# Patient Record
Sex: FEMALE | Race: BLACK OR AFRICAN AMERICAN | NOT HISPANIC OR LATINO | URBAN - METROPOLITAN AREA
[De-identification: names, ages, dates, MRNs, and addresses within clinical notes are randomized per-mention and may not be internally consistent; named-entity substitution may affect disease eponyms.]

---

## 2019-01-10 ENCOUNTER — EMERGENCY (EMERGENCY)
Facility: HOSPITAL | Age: 24
LOS: 0 days | Discharge: HOME | End: 2019-01-10
Attending: EMERGENCY MEDICINE
Payer: COMMERCIAL

## 2019-01-10 VITALS
HEIGHT: 60 IN | HEART RATE: 114 BPM | TEMPERATURE: 98 F | WEIGHT: 147.05 LBS | OXYGEN SATURATION: 100 % | RESPIRATION RATE: 17 BRPM | DIASTOLIC BLOOD PRESSURE: 90 MMHG | SYSTOLIC BLOOD PRESSURE: 142 MMHG

## 2019-01-10 DIAGNOSIS — M25.461 EFFUSION, RIGHT KNEE: ICD-10-CM

## 2019-01-10 DIAGNOSIS — M25.561 PAIN IN RIGHT KNEE: ICD-10-CM

## 2019-01-10 PROCEDURE — 99283 EMERGENCY DEPT VISIT LOW MDM: CPT

## 2019-01-10 PROCEDURE — 73562 X-RAY EXAM OF KNEE 3: CPT | Mod: 26,RT
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
## 2019-01-10 NOTE — ED PROVIDER NOTE - OBJECTIVE STATEMENT
this is a 22 yo f c/o of right knee pain s/p bf falling on knee x 3 days. Denies nausea, vomiting, fever, erythema,

## 2019-01-10 NOTE — ED ADULT NURSE NOTE - NSIMPLEMENTINTERV_GEN_ALL_ED
Implemented All Universal Safety Interventions:  [place] to call system. Call bell, personal items and telephone within reach. Instruct patient to call for assistance. Room bathroom lighting operational. Non-slip footwear when patient is off stretcher. Physically safe environment: no spills, clutter or unnecessary equipment. Stretcher in lowest position, wheels locked, appropriate side rails in place.

## 2019-02-14 ENCOUNTER — RESULT REVIEW (OUTPATIENT)
Age: 24
End: 2019-02-14

## 2020-08-06 ENCOUNTER — RESULT REVIEW (OUTPATIENT)
Age: 25
End: 2020-08-06

## 2021-01-17 PROBLEM — Z00.00 ENCOUNTER FOR PREVENTIVE HEALTH EXAMINATION: Status: ACTIVE | Noted: 2021-01-17

## 2021-01-20 ENCOUNTER — APPOINTMENT (OUTPATIENT)
Dept: PLASTIC SURGERY | Facility: CLINIC | Age: 26
End: 2021-01-20

## 2021-03-22 ENCOUNTER — RESULT REVIEW (OUTPATIENT)
Age: 26
End: 2021-03-22

## 2021-03-31 ENCOUNTER — APPOINTMENT (OUTPATIENT)
Dept: PLASTIC SURGERY | Facility: CLINIC | Age: 26
End: 2021-03-31

## 2021-04-19 ENCOUNTER — EMERGENCY (EMERGENCY)
Facility: HOSPITAL | Age: 26
LOS: 1 days | Discharge: HOME | End: 2021-04-19
Attending: EMERGENCY MEDICINE | Admitting: EMERGENCY MEDICINE
Payer: COMMERCIAL

## 2021-04-19 VITALS
RESPIRATION RATE: 18 BRPM | TEMPERATURE: 98 F | DIASTOLIC BLOOD PRESSURE: 74 MMHG | OXYGEN SATURATION: 100 % | SYSTOLIC BLOOD PRESSURE: 119 MMHG | HEIGHT: 60 IN | HEART RATE: 78 BPM

## 2021-04-19 VITALS
OXYGEN SATURATION: 100 % | RESPIRATION RATE: 18 BRPM | TEMPERATURE: 99 F | SYSTOLIC BLOOD PRESSURE: 118 MMHG | DIASTOLIC BLOOD PRESSURE: 69 MMHG | HEART RATE: 85 BPM

## 2021-04-19 LAB
ALBUMIN SERPL ELPH-MCNC: 4.1 G/DL — SIGNIFICANT CHANGE UP (ref 3.3–5)
ALP SERPL-CCNC: 82 U/L — SIGNIFICANT CHANGE UP (ref 40–120)
ALT FLD-CCNC: 9 U/L — SIGNIFICANT CHANGE UP (ref 4–33)
ANION GAP SERPL CALC-SCNC: 14 MMOL/L — SIGNIFICANT CHANGE UP (ref 7–14)
APPEARANCE UR: ABNORMAL
APTT BLD: 26.5 SEC — LOW (ref 27–36.3)
AST SERPL-CCNC: 27 U/L — SIGNIFICANT CHANGE UP (ref 4–32)
BACTERIA # UR AUTO: ABNORMAL
BILIRUB SERPL-MCNC: 0.3 MG/DL — SIGNIFICANT CHANGE UP (ref 0.2–1.2)
BILIRUB UR-MCNC: NEGATIVE — SIGNIFICANT CHANGE UP
BLD GP AB SCN SERPL QL: NEGATIVE — SIGNIFICANT CHANGE UP
BUN SERPL-MCNC: 7 MG/DL — SIGNIFICANT CHANGE UP (ref 7–23)
CALCIUM SERPL-MCNC: 10.1 MG/DL — SIGNIFICANT CHANGE UP (ref 8.4–10.5)
CHLORIDE SERPL-SCNC: 104 MMOL/L — SIGNIFICANT CHANGE UP (ref 98–107)
CO2 SERPL-SCNC: 20 MMOL/L — LOW (ref 22–31)
COLOR SPEC: ABNORMAL
CREAT SERPL-MCNC: 0.59 MG/DL — SIGNIFICANT CHANGE UP (ref 0.5–1.3)
DIFF PNL FLD: ABNORMAL
EPI CELLS # UR: ABNORMAL
GLUCOSE SERPL-MCNC: 103 MG/DL — HIGH (ref 70–99)
GLUCOSE UR QL: NEGATIVE — SIGNIFICANT CHANGE UP
HCG SERPL-ACNC: 906.1 MIU/ML — SIGNIFICANT CHANGE UP
HCT VFR BLD CALC: 34.8 % — SIGNIFICANT CHANGE UP (ref 34.5–45)
HGB BLD-MCNC: 11.1 G/DL — LOW (ref 11.5–15.5)
INR BLD: 1.18 RATIO — HIGH (ref 0.88–1.16)
KETONES UR-MCNC: ABNORMAL
LEUKOCYTE ESTERASE UR-ACNC: ABNORMAL
MCHC RBC-ENTMCNC: 29.2 PG — SIGNIFICANT CHANGE UP (ref 27–34)
MCHC RBC-ENTMCNC: 31.9 GM/DL — LOW (ref 32–36)
MCV RBC AUTO: 91.6 FL — SIGNIFICANT CHANGE UP (ref 80–100)
NITRITE UR-MCNC: NEGATIVE — SIGNIFICANT CHANGE UP
NRBC # BLD: 0 /100 WBCS — SIGNIFICANT CHANGE UP
NRBC # FLD: 0 K/UL — SIGNIFICANT CHANGE UP
PH UR: 6 — SIGNIFICANT CHANGE UP (ref 5–8)
PLATELET # BLD AUTO: 349 K/UL — SIGNIFICANT CHANGE UP (ref 150–400)
POTASSIUM SERPL-MCNC: 4.4 MMOL/L — SIGNIFICANT CHANGE UP (ref 3.5–5.3)
POTASSIUM SERPL-SCNC: 4.4 MMOL/L — SIGNIFICANT CHANGE UP (ref 3.5–5.3)
PROT SERPL-MCNC: 8.6 G/DL — HIGH (ref 6–8.3)
PROT UR-MCNC: ABNORMAL
PROTHROM AB SERPL-ACNC: 13.4 SEC — SIGNIFICANT CHANGE UP (ref 10.6–13.6)
RBC # BLD: 3.8 M/UL — SIGNIFICANT CHANGE UP (ref 3.8–5.2)
RBC # FLD: 13 % — SIGNIFICANT CHANGE UP (ref 10.3–14.5)
RBC CASTS # UR COMP ASSIST: >50 /HPF — SIGNIFICANT CHANGE UP (ref 0–4)
RH IG SCN BLD-IMP: POSITIVE — SIGNIFICANT CHANGE UP
SARS-COV-2 RNA SPEC QL NAA+PROBE: SIGNIFICANT CHANGE UP
SODIUM SERPL-SCNC: 138 MMOL/L — SIGNIFICANT CHANGE UP (ref 135–145)
SP GR SPEC: 1.03 — HIGH (ref 1.01–1.02)
UROBILINOGEN FLD QL: SIGNIFICANT CHANGE UP
WBC # BLD: 9.13 K/UL — SIGNIFICANT CHANGE UP (ref 3.8–10.5)
WBC # FLD AUTO: 9.13 K/UL — SIGNIFICANT CHANGE UP (ref 3.8–10.5)
WBC UR QL: SIGNIFICANT CHANGE UP /HPF (ref 0–5)

## 2021-04-19 PROCEDURE — 76830 TRANSVAGINAL US NON-OB: CPT | Mod: 26

## 2021-04-19 PROCEDURE — 99285 EMERGENCY DEPT VISIT HI MDM: CPT

## 2021-04-19 RX ORDER — ONDANSETRON 8 MG/1
4 TABLET, FILM COATED ORAL ONCE
Refills: 0 | Status: COMPLETED | OUTPATIENT
Start: 2021-04-19 | End: 2021-04-19

## 2021-04-19 RX ORDER — SODIUM CHLORIDE 9 MG/ML
1000 INJECTION INTRAMUSCULAR; INTRAVENOUS; SUBCUTANEOUS ONCE
Refills: 0 | Status: COMPLETED | OUTPATIENT
Start: 2021-04-19 | End: 2021-04-19

## 2021-04-19 RX ORDER — KETOROLAC TROMETHAMINE 30 MG/ML
15 SYRINGE (ML) INJECTION ONCE
Refills: 0 | Status: DISCONTINUED | OUTPATIENT
Start: 2021-04-19 | End: 2021-04-19

## 2021-04-19 RX ADMIN — ONDANSETRON 4 MILLIGRAM(S): 8 TABLET, FILM COATED ORAL at 10:16

## 2021-04-19 RX ADMIN — SODIUM CHLORIDE 1000 MILLILITER(S): 9 INJECTION INTRAMUSCULAR; INTRAVENOUS; SUBCUTANEOUS at 11:32

## 2021-04-19 RX ADMIN — Medication 15 MILLIGRAM(S): at 10:15

## 2021-04-19 NOTE — CONSULT NOTE ADULT - ASSESSMENT
24 yo  dating 9.0 wks gestational age by LMP 2/15 presenting w/ abdominal cramping, nausea, and vaginal bleeding s/p med ab . TVUS w/ thickened endometrium to 14mm w/ internal vascularity c/w retained POC.  Patient hemodynamically and vitally stable.  Cramping and nausea resolved in ED w/ use of toradol and zofran.  PAtient counseled on options of surgical vs. medical management for retained POC, patient electing for medical management.    -Rx for misoprostol 800 mcg buccally given- patient informed to allow medication to dissolve x30 min then swallow  -Rx for ibuprofen and oxycodone for pain control sent, zofran for nausea  -emergency precautions given: return if bleeding more than 2 pads an hours for 2 hours, intractable nausea/vomiting, foul smelling vaginal discharge, fever, or feeling may pass out  -patient to follow up in office later this week    d/w Dr. Onel Ozuna PGY2

## 2021-04-19 NOTE — CONSULT NOTE ADULT - SUBJECTIVE AND OBJECTIVE BOX
ANÍBAL LAZO  25y  Female [id number]    HPI:  24 yo  dating 9.0 wks gestational age by LMP 2/15 presenting w/ abdominal cramping, nausea, and vaginal bleeding s/p med ab .  Patient was followed in the office for a non-viable pregnancy.  Patient initially had heavy bleeding and cramping following administration of vaginal cytotec, she continued to have pain w/ light bleeding which is why she presented today.  Pain associated w/ nausea and emesis, last this am.  NPO since last night.  Denies fever, chills, SOB, chest pain.    [facility]    POB:  eTOP 4 years ago, medical management    Pgyn: Denies fibroids, cysts, endometriosis, STI's.  Patient reports recent abnormal pap smear to be followed up    Home meds: none    Allergies    No Known Allergies    Intolerances    PAST MEDICAL & SURGICAL HISTORY:  No pertinent past medical history    No significant past surgical history    FAMILY HISTORY:  No pertinent family history in first degree relatives    Vital Signs Last 24 Hrs  T(C): 36.7 (2021 09:27), Max: 36.7 (2021 09:27)  T(F): 98 (2021 09:27), Max: 98 (2021 09:27)  HR: 78 (2021 09:27) (78 - 78)  BP: 119/74 (2021 09:27) (119/74 - 119/74)  BP(mean): --  RR: 18 (2021 09:27) (18 - 18)  SpO2: 100% (2021 09:27) (100% - 100%)    Physical Exam:   General: sitting comfortably in bed, NAD   HEENT: neck supple, full ROM  CV: RRR  Lungs: nonlabored breathing on room air   Abd: Soft, non-tender, non-distended.  Bowel sounds present.    :   spotting on pad.  External labia wnl.  Bimanual exam with no cervical motion tenderness, uterus wnl, adnexa non palpable b/l.  Cervix dilated 0.5 cm  Speculum Exam: No active bleeding from os.  Scant old blood in vault.  Small amount of tissue present at the os that was extracted w/ ring forceps, attempts to remove additional tissue unsuccessful  Ext: non-tender b/l, no edema     LABS:                      11.1   9.13  )-----------( 349      ( 2021 10:45 )             34.8         138  |  104  |  7   ----------------------------<  103<H>  4.4   |  20<L>  |  0.59    Ca    10.1      2021 10:45    TPro  8.6<H>  /  Alb  4.1  /  TBili  0.3  /  DBili  x   /  AST  27  /  ALT  9   /  AlkPhos  82      I&O's Detail      Urinalysis Basic - ( 2021 10:45 )    Color: Brown / Appearance: Turbid / S.028 / pH: x  Gluc: x / Ketone: Trace  / Bili: Negative / Urobili: <2 mg/dL   Blood: x / Protein: 100 mg/dL / Nitrite: Negative   Leuk Esterase: Small / RBC: >50 /HPF / WBC 5-10 /HPF   Sq Epi: x / Non Sq Epi: Moderate / Bacteria: Few    RADIOLOGY & ADDITIONAL STUDIES:  < from: US Transvaginal (21 @ 10:43) >  EXAM:  US TRANSVAGINAL      PROCEDURE DATE:  2021     INTERPRETATION:  CLINICAL INFORMATION: Abdominal pain after  6 days ago, told no heartbeat, patient was 6 weeks pregnant, got intravaginal meds for     LMP: N/A    COMPARISON: None available.    TECHNIQUE:  Endovaginal pelvic sonogram only.    FINDINGS:    Uterus/Endometrium: 9.0 cm x 6.2 cm x 6.0 cm.    Thickened heterogeneous endometrial complex with increased vascularity measuring 14 mm, consistent with presence of hemorrhagic products and retained products of conception.    Right ovary: 3.3 cm x 1.9 cm x 2.5 cm. Within normal limits. Right corpus luteum measuring 1.9 cm.  Left ovary: 2.6 cm x 1.4 cm x 0.9 cm. Within normal limits.    Fluid: None.    IMPRESSION:  Thickened heterogeneous endometrial complex with increased vascularity consistent with the presence of hemorrhagic products and retained products of conception.    OSEI EMERY MD, Resident Radiology  This document has been electronically signed.  ROSANNA MALDONADO MD; Attending Radiologist  This document has been electronically signed. 2021 11:02AM

## 2021-04-19 NOTE — ED PROVIDER NOTE - PROGRESS NOTE DETAILS
+ retained product on US. OB-GYN consulted and will evaluate patient. Type and screen ordered. Patient updated and is non-toxic at this time.

## 2021-04-19 NOTE — ED PROVIDER NOTE - CONSTITUTIONAL, MLM
Non-toxic appearing, alert, awake, and speaking full sentences. Patient mildly uncomfortable appearing. normal...

## 2021-04-19 NOTE — ED PROVIDER NOTE - ATTENDING CONTRIBUTION TO CARE
Gong: I have seen and examined the patient face to face, have reviewed and addended the HPI, PE and a/p as necessary.     26 yo  (recently 6 weeks pregnant) with 6 days of lower abdominal pain after failed pregnancy.  Pt reports taking intravaginal medication to induce  and has been having worsening daily lower abdominal cramping radiating to lower back.  Reports passing clots.  Noted to have pain rated 9/10 in nature.  Noted to have no fever, no chills, no headache, vision changes, LE pain or swelling.      GEN - NAD; A+O x3; uncomfortable appearing; CARD -s1s2, RRR, no M,G,R; PULM - CTA b/l, symmetric breath sounds; ABD -  +BS, TTP in suprapubic region, soft, no guarding, no rebound, no masses; BACK - no CVA tenderness, Normal  spine; EXT - symmetric pulses, 2+ dp, capillary refill < 2 seconds, no cyanosis, no edema; NEURO - no focal neuro deficits, no slurred speech     Vaginal exam as per Fellow Shelia.    26 yo  (recently 6 weeks pregnant) with 6 days of lower abdominal pain after failed pregnancy. Concern for possible ectopic, vs retained products of conceptions vs possible inevitable .  - follow up tvus, labs, including hcg.  Reassess.  Pain control

## 2021-04-19 NOTE — ED ADULT NURSE NOTE - OBJECTIVE STATEMENT
Pt presenting to intake AxOx4 ambulatory at baseline with c.o pelvic cramping, N/V. Pt s/p miscarriage over the weekend- states she was 6 weeks pregnant. On arrival to ED pt's breathing is even and unlabored. Palor/diaphoresis not noted. pt denies CP, SOB, fever. IV established with 20g in LAC. Labs drawn and sent. MD at bedside, will continue to monitor.

## 2021-04-19 NOTE — ED PROVIDER NOTE - GENITOURINARY VAGINAL
Significant blood clots in the vaginal vault without significant active bleeding./BLOOD IN VAGINAL VAULT

## 2021-04-19 NOTE — ED ADULT NURSE REASSESSMENT NOTE - NS ED NURSE REASSESS COMMENT FT1
pt resting in results waiting. pt alert and oriented x4. Denies pain or discomforts at this time. Appears comfortable. Pt waiting for GYN

## 2021-04-19 NOTE — ED PROVIDER NOTE - PATIENT PORTAL LINK FT
You can access the FollowMyHealth Patient Portal offered by [facility] by registering at the following website: [email or web address]. By joining [employer]’s FollowMyHealth portal, you will also be able to view your health information using other applications (apps) compatible with our system.

## 2021-04-19 NOTE — ED PROVIDER NOTE - OBJECTIVE STATEMENT
26 y/o F  presents for 6 days of lower abdominal pain. Reports she was 6 weeks pregnant and went to a [facility] in [place]. Patient was told at the clinic there was no fetal heartbeat and was provided intravaginal medication to induce an . Since then, she has had daily lower cramping abdominal pain that occasionally radiates to lower back. Pain slightly better with Ibuprofen and nothing makes the pain worse. Associated with blood with urination, small blood clots from vagina, and daily NBNB vomiting. She is unable to quantify how many times per day she is vomiting because it is very frequent. Denies abdominal pain outside of her lower abdominal region, chest pain, leg swelling/redness, headache, vision changes, and history of HTN. 26 y/o F  presents for 6 days of lower abdominal pain. Reports she was 6 weeks pregnant and went to a [facility] Clinic in [place]. Patient was told at the clinic there was no fetal heartbeat and was provided intravaginal medication to induce an . Since then, she has had daily lower cramping abdominal pain that occasionally radiates to lower back. Pain slightly better with Ibuprofen and nothing makes the pain worse. Associated with blood with urination, small blood clots from vagina, and daily NBNB vomiting. She is unable to quantify how many times per day she is vomiting because it is very frequent. Denies abdominal pain outside of her lower abdominal region, chest pain, leg swelling/redness, headache, vision changes, or history of HTN.

## 2021-04-19 NOTE — ED PROVIDER NOTE - CLINICAL SUMMARY MEDICAL DECISION MAKING FREE TEXT BOX
24 y/o F with recent  seen for persistent pain, vomiting, and slight vaginal bleeding. Uncomfortable, but non-toxic appearing. Hemodynamically stable. Abdomen is soft with suprapubic tenderness. Will obtain labs, US, and treat pain/nausea. US to look for retained product.

## 2021-04-19 NOTE — ED PROVIDER NOTE - NSFOLLOWUPINSTRUCTIONS_ED_ALL_ED_FT
You were seen for abdominal pain. Your ultrasound shows that there are still parts of the fetus inside the uterus. You were seen by the OB Gyn doctor and prescribed medications to be taken as directed by the OB Gyn doctor.     - Misoprostol 800 mcg buccally (inside the cheek)-  allow medication to dissolve for 30 min then swallow  - Prescription for ibuprofen and oxycodone for pain, Zofran for nausea  -  Return if bleeding more than 2 pads an hours for 2 hours, continuous nausea/vomiting, foul smelling vaginal discharge, fever, or feeling you may pass out, or any other concern for a medical emergency.  Follow up in office later this week as discussed with OB Gyn      Miscarriage    WHAT YOU NEED TO KNOW:    A miscarriage is the loss of a fetus within the first 20 weeks of pregnancy. A miscarriage may also be called a spontaneous  or an early pregnancy loss.    DISCHARGE INSTRUCTIONS:    Return to the emergency department if:   •You have foul-smelling drainage or pus coming from your vagina.    •You have heavy vaginal bleeding and soak 1 pad or more in an hour.    •You have severe abdominal pain.    •You feel like your heart is beating faster than normal.    •You feel extremely weak or dizzy.    Contact your healthcare provider if:   •You have a fever greater than 100.4°F or chills.    •You have extreme sadness, grief, or feel unable to cope with what has happened.    •You have questions or concerns about your condition or care.    Self-care:   •Do not put anything in your vagina for 2 weeks or as directed. Do not use tampons, douche, or have sex. These actions can cause infection and pain.    •Use sanitary pads as needed. You may have light bleeding or spotting for 2 weeks.    •Do not take a bath or go swimming for 2 weeks or as directed. These actions may increase your risk for an infection. Take showers only.    •Rest as needed. Slowly start to do more each day. Return to your daily activities as directed.    •Talk to your healthcare provider about birth control. If you would like to prevent another pregnancy, ask your healthcare provider which type of birth control is best for you.    •Join a support group or therapy to help you cope. A miscarriage may be very difficult for you, your partner, and other members of your family. There is no right way to feel after a miscarriage. You may feel overwhelming grief or other emotions. It may be helpful to talk to a friend, family member, or counselor about your feelings. You may worry that you could have another miscarriage. Talk to your healthcare provider about your concerns. He or she may be able to help you reduce the risk for another miscarriage. He or she may also help you find ways to cope with grief.    For more information:   •The [employer]  [address] [address]  [place] [address]  Phone: [phone]  Phone: 1-392.615.8277  Web Address: http://www.acog.org    •[facility] [facility]  [address] [place] [address]  Web Address: [email or web address]    Follow up with your healthcare provider as directed: You may need to see your healthcare provider for blood tests or an ultrasound. Write down your questions so you remember to ask them during your visits.

## 2021-04-21 ENCOUNTER — OUTPATIENT (OUTPATIENT)
Dept: OUTPATIENT SERVICES | Facility: HOSPITAL | Age: 26
LOS: 1 days | End: 2021-04-21
Payer: COMMERCIAL

## 2021-04-21 VITALS
SYSTOLIC BLOOD PRESSURE: 132 MMHG | TEMPERATURE: 98 F | HEART RATE: 78 BPM | WEIGHT: 169.09 LBS | RESPIRATION RATE: 16 BRPM | HEIGHT: 60 IN | DIASTOLIC BLOOD PRESSURE: 84 MMHG | OXYGEN SATURATION: 98 %

## 2021-04-21 DIAGNOSIS — Z01.818 ENCOUNTER FOR OTHER PREPROCEDURAL EXAMINATION: ICD-10-CM

## 2021-04-21 DIAGNOSIS — O02.1 MISSED ABORTION: ICD-10-CM

## 2021-04-21 LAB
BLD GP AB SCN SERPL QL: SIGNIFICANT CHANGE UP
SARS-COV-2 RNA SPEC QL NAA+PROBE: SIGNIFICANT CHANGE UP

## 2021-04-21 PROCEDURE — G0463: CPT

## 2021-04-21 PROCEDURE — 86901 BLOOD TYPING SEROLOGIC RH(D): CPT

## 2021-04-21 PROCEDURE — 86850 RBC ANTIBODY SCREEN: CPT

## 2021-04-21 PROCEDURE — 36415 COLL VENOUS BLD VENIPUNCTURE: CPT

## 2021-04-21 PROCEDURE — U0005: CPT

## 2021-04-21 PROCEDURE — U0003: CPT

## 2021-04-21 PROCEDURE — 86900 BLOOD TYPING SEROLOGIC ABO: CPT

## 2021-04-21 NOTE — H&P PST ADULT - NSANTHOSAYNRD_GEN_A_CORE
No. MARGRET screening performed.  STOP BANG Legend: 0-2 = LOW Risk; 3-4 = INTERMEDIATE Risk; 5-8 = HIGH Risk

## 2021-04-21 NOTE — H&P PST ADULT - HISTORY OF PRESENT ILLNESS
24 y/o female, LMP 2/15/21, first pregnancy, was seen in the office a week ago, , was told there is no heart beat, was given a pill for ,  started bleeding, however has retained products of conception. Was seen in [facility] ER on 21. Had blood work and US, confirmed the products of conception is retained. Scheduled for suction D&C, Miss: on 21. Pre op testing today.

## 2021-04-21 NOTE — H&P PST ADULT - NSICDXPROBLEM_GEN_ALL_CORE_FT
PROBLEM DIAGNOSES  Problem: Missed   Assessment and Plan: suction D&C  Pre op instructions:   Medical eval not needed.  Hold OTC supplements. Medications reviewed for the week and morning of surgery.  NPO after 11pm to the morning of surgery.  Covid testing done today  Patient verbalized understanding.

## 2021-04-22 ENCOUNTER — TRANSCRIPTION ENCOUNTER (OUTPATIENT)
Age: 26
End: 2021-04-22

## 2021-04-22 RX ORDER — SODIUM CHLORIDE 9 MG/ML
1000 INJECTION, SOLUTION INTRAVENOUS
Refills: 0 | Status: DISCONTINUED | OUTPATIENT
Start: 2021-04-23 | End: 2021-05-08

## 2021-04-22 RX ORDER — OXYCODONE HYDROCHLORIDE 5 MG/1
5 TABLET ORAL ONCE
Refills: 0 | Status: DISCONTINUED | OUTPATIENT
Start: 2021-04-23 | End: 2021-04-27

## 2021-04-22 RX ORDER — HYDROMORPHONE HYDROCHLORIDE 2 MG/ML
0.5 INJECTION INTRAMUSCULAR; INTRAVENOUS; SUBCUTANEOUS
Refills: 0 | Status: DISCONTINUED | OUTPATIENT
Start: 2021-04-23 | End: 2021-04-27

## 2021-04-22 RX ORDER — SODIUM CHLORIDE 9 MG/ML
1000 INJECTION, SOLUTION INTRAVENOUS
Refills: 0 | Status: DISCONTINUED | OUTPATIENT
Start: 2021-04-23 | End: 2021-04-23

## 2021-04-23 ENCOUNTER — RESULT REVIEW (OUTPATIENT)
Age: 26
End: 2021-04-23

## 2021-04-23 ENCOUNTER — OUTPATIENT (OUTPATIENT)
Dept: OUTPATIENT SERVICES | Facility: HOSPITAL | Age: 26
LOS: 1 days | Discharge: HOME | End: 2021-04-23
Payer: COMMERCIAL

## 2021-04-23 VITALS
DIASTOLIC BLOOD PRESSURE: 69 MMHG | TEMPERATURE: 99 F | RESPIRATION RATE: 15 BRPM | SYSTOLIC BLOOD PRESSURE: 119 MMHG | WEIGHT: 169.09 LBS | HEIGHT: 60 IN | OXYGEN SATURATION: 100 % | HEART RATE: 73 BPM

## 2021-04-23 VITALS
HEART RATE: 67 BPM | RESPIRATION RATE: 16 BRPM | SYSTOLIC BLOOD PRESSURE: 106 MMHG | DIASTOLIC BLOOD PRESSURE: 62 MMHG | OXYGEN SATURATION: 97 %

## 2021-04-23 PROCEDURE — 59820 CARE OF MISCARRIAGE: CPT

## 2021-04-23 PROCEDURE — 88305 TISSUE EXAM BY PATHOLOGIST: CPT

## 2021-04-23 PROCEDURE — 88305 TISSUE EXAM BY PATHOLOGIST: CPT | Mod: 26

## 2021-04-23 RX ORDER — ONDANSETRON 8 MG/1
4 TABLET, FILM COATED ORAL ONCE
Refills: 0 | Status: COMPLETED | OUTPATIENT
Start: 2021-04-23 | End: 2021-04-23

## 2021-04-23 RX ORDER — ACETAMINOPHEN 500 MG
975 TABLET ORAL ONCE
Refills: 0 | Status: COMPLETED | OUTPATIENT
Start: 2021-04-23 | End: 2021-04-23

## 2021-04-23 RX ORDER — ACETAMINOPHEN 500 MG
2 TABLET ORAL
Qty: 56 | Refills: 0
Start: 2021-04-23 | End: 2021-04-29

## 2021-04-23 RX ORDER — IBUPROFEN 200 MG
0 TABLET ORAL
Qty: 0 | Refills: 0 | DISCHARGE

## 2021-04-23 RX ORDER — IBUPROFEN 200 MG
1 TABLET ORAL
Qty: 28 | Refills: 0
Start: 2021-04-23 | End: 2021-04-29

## 2021-04-23 RX ADMIN — HYDROMORPHONE HYDROCHLORIDE 0.5 MILLIGRAM(S): 2 INJECTION INTRAMUSCULAR; INTRAVENOUS; SUBCUTANEOUS at 16:45

## 2021-04-23 RX ADMIN — SODIUM CHLORIDE 60 MILLILITER(S): 9 INJECTION, SOLUTION INTRAVENOUS at 13:03

## 2021-04-23 RX ADMIN — HYDROMORPHONE HYDROCHLORIDE 0.5 MILLIGRAM(S): 2 INJECTION INTRAMUSCULAR; INTRAVENOUS; SUBCUTANEOUS at 15:57

## 2021-04-23 RX ADMIN — ONDANSETRON 4 MILLIGRAM(S): 8 TABLET, FILM COATED ORAL at 15:54

## 2021-04-23 RX ADMIN — Medication 975 MILLIGRAM(S): at 13:01

## 2021-04-23 NOTE — BRIEF OPERATIVE NOTE - NSICDXBRIEFPREOP_GEN_ALL_CORE_FT
PRE-OP DIAGNOSIS:  Missed  2021 14:46:07  Maria C Sykes  Retained products of conception, early pregnancy 2021 14:46:30  Maria C Sykes

## 2021-04-23 NOTE — BRIEF OPERATIVE NOTE - NSICDXBRIEFPROCEDURE_GEN_ALL_CORE_FT
PROCEDURES:  Dilation and curettage, uterus, using suction, for missed first trimester  2021 14:45:58  Maria C Sykes

## 2021-04-23 NOTE — BRIEF OPERATIVE NOTE - OPERATION/FINDINGS
8 week size anteverted uterus 8 week size anteverted uterus  suction curretage performed   uterine atony noted, hembate and methergine IM given by anethesia  after suction, cervical lac at 3 o clock noted, cervical laceration repaired with 0 vicryl suture

## 2021-04-23 NOTE — ASU DISCHARGE PLAN (ADULT/PEDIATRIC) - CARE PROVIDER_API CALL
Maria C Sykes (DO)  Obstetrics and Gynecology Obstetrics and Gynocology  [address] [place] [address]  Phone: [phone]  Fax: [phone]  Established Patient  Follow Up Time: 2 weeks

## 2021-04-23 NOTE — ASU DISCHARGE PLAN (ADULT/PEDIATRIC) - CALL YOUR DOCTOR IF YOU HAVE ANY OF THE FOLLOWING:
Bleeding that does not stop/Pain not relieved by Medications/Fever greater than (need to indicate Fahrenheit or Celsius) Bleeding that does not stop/Pain not relieved by Medications/Fever greater than (need to indicate Fahrenheit or Celsius)/Nausea and vomiting that does not stop/Inability to tolerate liquids or foods

## 2021-04-23 NOTE — BRIEF OPERATIVE NOTE - NSICDXBRIEFPOSTOP_GEN_ALL_CORE_FT
POST-OP DIAGNOSIS:  Missed  2021 14:46:39  Maria C Sykes  Retained products of conception, early pregnancy 2021 14:47:07  Maria C Sykes

## 2021-09-01 ENCOUNTER — APPOINTMENT (OUTPATIENT)
Dept: PLASTIC SURGERY | Facility: CLINIC | Age: 26
End: 2021-09-01
Payer: COMMERCIAL

## 2021-09-01 VITALS
SYSTOLIC BLOOD PRESSURE: 120 MMHG | HEIGHT: 60 IN | DIASTOLIC BLOOD PRESSURE: 76 MMHG | WEIGHT: 175 LBS | BODY MASS INDEX: 34.36 KG/M2 | TEMPERATURE: 97.8 F | HEART RATE: 93 BPM

## 2021-09-01 DIAGNOSIS — N63.0 UNSPECIFIED LUMP IN UNSPECIFIED BREAST: ICD-10-CM

## 2021-09-01 DIAGNOSIS — N62 HYPERTROPHY OF BREAST: ICD-10-CM

## 2021-09-01 PROCEDURE — 99204 OFFICE O/P NEW MOD 45 MIN: CPT

## 2021-09-06 PROBLEM — N62 MACROMASTIA: Status: ACTIVE | Noted: 2021-09-06

## 2021-09-06 PROBLEM — N63.0 MASS OF NIPPLE: Status: ACTIVE | Noted: 2021-09-06

## 2021-09-06 NOTE — ASSESSMENT
[FreeTextEntry1] : I estimate approximately 600 g would be removed from each side.  I recommend excisional biopsy of the right areola lesion prior to considering breast reduction.  This can be performed in the office.  We will also need her prior right breast ultrasound.  I expressed concern with this right areola lesion could represent a keloid scar and may recur.  Additionally, anywhere she has covers may turn to keloid scars.  She expresses understanding.  I also explained that the significant infection may make it difficult for her to breast-feed, and, if she becomes pregnant, her breasts are likely to stretch out and become ptotic again.  I also reviewed the significant risk of loss of nipple sensation with this procedure.\par \par She agrees to bring the right breast ultrasound report, and we will plan for excisional biopsy of the right areolar lesion in the office.

## 2021-09-06 NOTE — HISTORY OF PRESENT ILLNESS
[FreeTextEntry1] : 18-year-old woman presents with upper back pain for over a year which she feels is secondary to her large breasts.  She currently wears a quadruple D brassiere and wishes to be a C cup if possible.  She has limited relief of her pain with over-the-counter pain medications, including nonsteroidal anti-inflammatory drugs.  She denies any lumps, bumps, or other recent changes in the breast.  She has a history of a right breast ultrasound for a right areolar lesion.  She states the lesion was not biopsied.  She denies any prior wound in that area.  She denies any family history of breast cancer.  She states that nipple sensation is very important to her (5 out of 5 importance).  And she plans to breast-feed in the future.  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  She lives with her father, aramis, whom she states would be able to assist her after surgery.  She denies any nicotine use or recreational drug use; she sometimes drinks alcohol.

## 2021-09-06 NOTE — PHYSICAL EXAM
[de-identified] : NAD.  BMI 34.2. [de-identified] : Normal respiratory effort. [de-identified] : Right areolar skin lesion 1x2 cm, feels confined to dermis. No dominant masses, nipple retraction, or palpable axillary lymphadenopathy. SN->N distance is 33 cm on the right and 32.5 cm on the left; width is 20.5 cm on the right and 20.5 cm on the left; N->IMF is 16 cm on the right and 15 cm on the left. There is grade 3 ptosis.

## 2021-10-22 ENCOUNTER — APPOINTMENT (OUTPATIENT)
Dept: PLASTIC SURGERY | Facility: CLINIC | Age: 26
End: 2021-10-22

## 2021-11-19 ENCOUNTER — ASOB RESULT (OUTPATIENT)
Age: 26
End: 2021-11-19

## 2021-11-19 ENCOUNTER — APPOINTMENT (OUTPATIENT)
Dept: ANTEPARTUM | Facility: CLINIC | Age: 26
End: 2021-11-19
Payer: MEDICAID

## 2021-11-19 PROCEDURE — 36416 COLLJ CAPILLARY BLOOD SPEC: CPT

## 2021-11-19 PROCEDURE — 76801 OB US < 14 WKS SINGLE FETUS: CPT

## 2021-11-19 PROCEDURE — 76813 OB US NUCHAL MEAS 1 GEST: CPT

## 2022-01-18 ENCOUNTER — APPOINTMENT (OUTPATIENT)
Dept: ANTEPARTUM | Facility: CLINIC | Age: 27
End: 2022-01-18
Payer: COMMERCIAL

## 2022-01-18 ENCOUNTER — ASOB RESULT (OUTPATIENT)
Age: 27
End: 2022-01-18

## 2022-01-18 PROCEDURE — 76811 OB US DETAILED SNGL FETUS: CPT

## 2022-01-28 ENCOUNTER — APPOINTMENT (OUTPATIENT)
Dept: ANTEPARTUM | Facility: CLINIC | Age: 27
End: 2022-01-28
Payer: COMMERCIAL

## 2022-01-28 ENCOUNTER — ASOB RESULT (OUTPATIENT)
Age: 27
End: 2022-01-28

## 2022-01-28 PROCEDURE — 76816 OB US FOLLOW-UP PER FETUS: CPT

## 2022-04-06 ENCOUNTER — APPOINTMENT (OUTPATIENT)
Dept: ANTEPARTUM | Facility: CLINIC | Age: 27
End: 2022-04-06

## 2022-04-07 ENCOUNTER — ASOB RESULT (OUTPATIENT)
Age: 27
End: 2022-04-07

## 2022-04-07 ENCOUNTER — APPOINTMENT (OUTPATIENT)
Dept: ANTEPARTUM | Facility: CLINIC | Age: 27
End: 2022-04-07
Payer: COMMERCIAL

## 2022-04-07 PROCEDURE — 76816 OB US FOLLOW-UP PER FETUS: CPT

## 2022-04-07 PROCEDURE — 76820 UMBILICAL ARTERY ECHO: CPT

## 2022-04-07 PROCEDURE — 76818 FETAL BIOPHYS PROFILE W/NST: CPT

## 2022-04-07 PROCEDURE — 99212 OFFICE O/P EST SF 10 MIN: CPT | Mod: 25

## 2022-04-11 ENCOUNTER — ASOB RESULT (OUTPATIENT)
Age: 27
End: 2022-04-11

## 2022-04-11 ENCOUNTER — APPOINTMENT (OUTPATIENT)
Dept: ANTEPARTUM | Facility: CLINIC | Age: 27
End: 2022-04-11
Payer: COMMERCIAL

## 2022-04-11 PROCEDURE — 76818 FETAL BIOPHYS PROFILE W/NST: CPT

## 2022-04-11 PROCEDURE — 76820 UMBILICAL ARTERY ECHO: CPT

## 2022-04-14 ENCOUNTER — APPOINTMENT (OUTPATIENT)
Dept: ANTEPARTUM | Facility: CLINIC | Age: 27
End: 2022-04-14

## 2022-04-19 ENCOUNTER — ASOB RESULT (OUTPATIENT)
Age: 27
End: 2022-04-19

## 2022-04-19 ENCOUNTER — APPOINTMENT (OUTPATIENT)
Dept: ANTEPARTUM | Facility: CLINIC | Age: 27
End: 2022-04-19
Payer: COMMERCIAL

## 2022-04-19 ENCOUNTER — APPOINTMENT (OUTPATIENT)
Dept: ANTEPARTUM | Facility: CLINIC | Age: 27
End: 2022-04-19

## 2022-04-19 PROCEDURE — 76818 FETAL BIOPHYS PROFILE W/NST: CPT

## 2022-04-26 ENCOUNTER — ASOB RESULT (OUTPATIENT)
Age: 27
End: 2022-04-26

## 2022-04-26 ENCOUNTER — APPOINTMENT (OUTPATIENT)
Dept: ANTEPARTUM | Facility: CLINIC | Age: 27
End: 2022-04-26
Payer: COMMERCIAL

## 2022-04-26 PROCEDURE — 76820 UMBILICAL ARTERY ECHO: CPT

## 2022-04-26 PROCEDURE — 76818 FETAL BIOPHYS PROFILE W/NST: CPT

## 2022-04-26 PROCEDURE — 76816 OB US FOLLOW-UP PER FETUS: CPT

## 2022-05-02 ENCOUNTER — APPOINTMENT (OUTPATIENT)
Dept: ANTEPARTUM | Facility: CLINIC | Age: 27
End: 2022-05-02
Payer: COMMERCIAL

## 2022-05-02 ENCOUNTER — ASOB RESULT (OUTPATIENT)
Age: 27
End: 2022-05-02

## 2022-05-02 ENCOUNTER — APPOINTMENT (OUTPATIENT)
Dept: ANTEPARTUM | Facility: CLINIC | Age: 27
End: 2022-05-02

## 2022-05-02 PROCEDURE — 76818 FETAL BIOPHYS PROFILE W/NST: CPT

## 2022-05-02 PROCEDURE — 76820 UMBILICAL ARTERY ECHO: CPT

## 2022-05-02 PROCEDURE — ZZZZZ: CPT

## 2022-05-09 ENCOUNTER — ASOB RESULT (OUTPATIENT)
Age: 27
End: 2022-05-09

## 2022-05-09 ENCOUNTER — APPOINTMENT (OUTPATIENT)
Dept: ANTEPARTUM | Facility: CLINIC | Age: 27
End: 2022-05-09

## 2022-05-09 ENCOUNTER — NON-APPOINTMENT (OUTPATIENT)
Age: 27
End: 2022-05-09

## 2022-05-12 ENCOUNTER — INPATIENT (INPATIENT)
Facility: HOSPITAL | Age: 27
LOS: 2 days | Discharge: HOME | End: 2022-05-15
Attending: OBSTETRICS & GYNECOLOGY | Admitting: OBSTETRICS & GYNECOLOGY

## 2022-05-12 VITALS — DIASTOLIC BLOOD PRESSURE: 79 MMHG | HEART RATE: 90 BPM | SYSTOLIC BLOOD PRESSURE: 142 MMHG

## 2022-05-12 DIAGNOSIS — O13.3 GESTATIONAL [PREGNANCY-INDUCED] HYPERTENSION WITHOUT SIGNIFICANT PROTEINURIA, THIRD TRIMESTER: ICD-10-CM

## 2022-05-12 LAB
ALBUMIN SERPL ELPH-MCNC: 3.6 G/DL — SIGNIFICANT CHANGE UP (ref 3.3–5)
ALP SERPL-CCNC: 162 U/L — HIGH (ref 40–120)
ALT FLD-CCNC: 5 U/L — SIGNIFICANT CHANGE UP (ref 4–33)
ANION GAP SERPL CALC-SCNC: 11 MMOL/L — SIGNIFICANT CHANGE UP (ref 7–14)
APPEARANCE UR: ABNORMAL
APTT BLD: 29.4 SEC — SIGNIFICANT CHANGE UP (ref 27–36.3)
AST SERPL-CCNC: 10 U/L — SIGNIFICANT CHANGE UP (ref 4–32)
BACTERIA # UR AUTO: ABNORMAL
BASOPHILS # BLD AUTO: 0.02 K/UL — SIGNIFICANT CHANGE UP (ref 0–0.2)
BASOPHILS NFR BLD AUTO: 0.3 % — SIGNIFICANT CHANGE UP (ref 0–2)
BILIRUB SERPL-MCNC: 0.3 MG/DL — SIGNIFICANT CHANGE UP (ref 0.2–1.2)
BILIRUB UR-MCNC: NEGATIVE — SIGNIFICANT CHANGE UP
BLD GP AB SCN SERPL QL: NEGATIVE — SIGNIFICANT CHANGE UP
BUN SERPL-MCNC: 6 MG/DL — LOW (ref 7–23)
CALCIUM SERPL-MCNC: 10 MG/DL — SIGNIFICANT CHANGE UP (ref 8.4–10.5)
CHLORIDE SERPL-SCNC: 105 MMOL/L — SIGNIFICANT CHANGE UP (ref 98–107)
CO2 SERPL-SCNC: 19 MMOL/L — LOW (ref 22–31)
COLOR SPEC: YELLOW — SIGNIFICANT CHANGE UP
COVID-19 SPIKE DOMAIN AB INTERP: POSITIVE
COVID-19 SPIKE DOMAIN ANTIBODY RESULT: >250 U/ML — HIGH
CREAT ?TM UR-MCNC: 126 MG/DL — SIGNIFICANT CHANGE UP
CREAT SERPL-MCNC: 0.52 MG/DL — SIGNIFICANT CHANGE UP (ref 0.5–1.3)
DIFF PNL FLD: NEGATIVE — SIGNIFICANT CHANGE UP
EGFR: 130 ML/MIN/1.73M2 — SIGNIFICANT CHANGE UP
EOSINOPHIL # BLD AUTO: 0.03 K/UL — SIGNIFICANT CHANGE UP (ref 0–0.5)
EOSINOPHIL NFR BLD AUTO: 0.4 % — SIGNIFICANT CHANGE UP (ref 0–6)
EPI CELLS # UR: 7 /HPF — HIGH (ref 0–5)
FIBRINOGEN PPP-MCNC: 735 MG/DL — HIGH (ref 330–520)
GLUCOSE SERPL-MCNC: 82 MG/DL — SIGNIFICANT CHANGE UP (ref 70–99)
GLUCOSE UR QL: NEGATIVE — SIGNIFICANT CHANGE UP
HCT VFR BLD CALC: 30.2 % — LOW (ref 34.5–45)
HGB BLD-MCNC: 10.1 G/DL — LOW (ref 11.5–15.5)
HYALINE CASTS # UR AUTO: 2 /LPF — SIGNIFICANT CHANGE UP (ref 0–7)
IANC: 5.04 K/UL — SIGNIFICANT CHANGE UP (ref 1.8–7.4)
IMM GRANULOCYTES NFR BLD AUTO: 0.4 % — SIGNIFICANT CHANGE UP (ref 0–1.5)
INR BLD: 0.98 RATIO — SIGNIFICANT CHANGE UP (ref 0.88–1.16)
KETONES UR-MCNC: NEGATIVE — SIGNIFICANT CHANGE UP
LDH SERPL L TO P-CCNC: 163 U/L — SIGNIFICANT CHANGE UP (ref 135–225)
LEUKOCYTE ESTERASE UR-ACNC: ABNORMAL
LYMPHOCYTES # BLD AUTO: 1.66 K/UL — SIGNIFICANT CHANGE UP (ref 1–3.3)
LYMPHOCYTES # BLD AUTO: 22.6 % — SIGNIFICANT CHANGE UP (ref 13–44)
MCHC RBC-ENTMCNC: 32 PG — SIGNIFICANT CHANGE UP (ref 27–34)
MCHC RBC-ENTMCNC: 33.4 GM/DL — SIGNIFICANT CHANGE UP (ref 32–36)
MCV RBC AUTO: 95.6 FL — SIGNIFICANT CHANGE UP (ref 80–100)
MONOCYTES # BLD AUTO: 0.58 K/UL — SIGNIFICANT CHANGE UP (ref 0–0.9)
MONOCYTES NFR BLD AUTO: 7.9 % — SIGNIFICANT CHANGE UP (ref 2–14)
NEUTROPHILS # BLD AUTO: 5.04 K/UL — SIGNIFICANT CHANGE UP (ref 1.8–7.4)
NEUTROPHILS NFR BLD AUTO: 68.4 % — SIGNIFICANT CHANGE UP (ref 43–77)
NITRITE UR-MCNC: NEGATIVE — SIGNIFICANT CHANGE UP
NRBC # BLD: 0 /100 WBCS — SIGNIFICANT CHANGE UP
NRBC # FLD: 0 K/UL — SIGNIFICANT CHANGE UP
PH UR: 7 — SIGNIFICANT CHANGE UP (ref 5–8)
PLATELET # BLD AUTO: 345 K/UL — SIGNIFICANT CHANGE UP (ref 150–400)
POTASSIUM SERPL-MCNC: 3.4 MMOL/L — LOW (ref 3.5–5.3)
POTASSIUM SERPL-SCNC: 3.4 MMOL/L — LOW (ref 3.5–5.3)
PROT ?TM UR-MCNC: 20 MG/DL — SIGNIFICANT CHANGE UP
PROT ?TM UR-MCNC: 20 MG/DL — SIGNIFICANT CHANGE UP
PROT SERPL-MCNC: 7.2 G/DL — SIGNIFICANT CHANGE UP (ref 6–8.3)
PROT UR-MCNC: ABNORMAL
PROT/CREAT UR-RTO: 0.2 RATIO — SIGNIFICANT CHANGE UP (ref 0–0.2)
PROTHROM AB SERPL-ACNC: 11.4 SEC — SIGNIFICANT CHANGE UP (ref 10.5–13.4)
RBC # BLD: 3.16 M/UL — LOW (ref 3.8–5.2)
RBC # FLD: 13.9 % — SIGNIFICANT CHANGE UP (ref 10.3–14.5)
RBC CASTS # UR COMP ASSIST: 2 /HPF — SIGNIFICANT CHANGE UP (ref 0–4)
RH IG SCN BLD-IMP: POSITIVE — SIGNIFICANT CHANGE UP
RH IG SCN BLD-IMP: POSITIVE — SIGNIFICANT CHANGE UP
SARS-COV-2 IGG+IGM SERPL QL IA: >250 U/ML — HIGH
SARS-COV-2 IGG+IGM SERPL QL IA: POSITIVE
SODIUM SERPL-SCNC: 135 MMOL/L — SIGNIFICANT CHANGE UP (ref 135–145)
SP GR SPEC: 1.02 — SIGNIFICANT CHANGE UP (ref 1–1.05)
URATE SERPL-MCNC: 4.6 MG/DL — SIGNIFICANT CHANGE UP (ref 2.5–7)
UROBILINOGEN FLD QL: SIGNIFICANT CHANGE UP
WBC # BLD: 7.36 K/UL — SIGNIFICANT CHANGE UP (ref 3.8–10.5)
WBC # FLD AUTO: 7.36 K/UL — SIGNIFICANT CHANGE UP (ref 3.8–10.5)
WBC UR QL: 4 /HPF — SIGNIFICANT CHANGE UP (ref 0–5)

## 2022-05-12 RX ORDER — CITRIC ACID/SODIUM CITRATE 300-500 MG
15 SOLUTION, ORAL ORAL EVERY 6 HOURS
Refills: 0 | Status: DISCONTINUED | OUTPATIENT
Start: 2022-05-12 | End: 2022-05-13

## 2022-05-12 RX ORDER — AMPICILLIN TRIHYDRATE 250 MG
2 CAPSULE ORAL ONCE
Refills: 0 | Status: COMPLETED | OUTPATIENT
Start: 2022-05-12 | End: 2022-05-12

## 2022-05-12 RX ORDER — SODIUM CHLORIDE 9 MG/ML
1000 INJECTION, SOLUTION INTRAVENOUS
Refills: 0 | Status: DISCONTINUED | OUTPATIENT
Start: 2022-05-12 | End: 2022-05-13

## 2022-05-12 RX ORDER — AMPICILLIN TRIHYDRATE 250 MG
1 CAPSULE ORAL EVERY 4 HOURS
Refills: 0 | Status: DISCONTINUED | OUTPATIENT
Start: 2022-05-12 | End: 2022-05-13

## 2022-05-12 RX ORDER — BENZOYL PEROXIDE MICRONIZED 5.8 %
1 TOWELETTE (EA) TOPICAL
Qty: 0 | Refills: 0 | DISCHARGE

## 2022-05-12 RX ORDER — OXYTOCIN 10 UNIT/ML
333.33 VIAL (ML) INJECTION
Qty: 20 | Refills: 0 | Status: DISCONTINUED | OUTPATIENT
Start: 2022-05-12 | End: 2022-05-15

## 2022-05-12 RX ADMIN — Medication 108 GRAM(S): at 23:00

## 2022-05-12 RX ADMIN — Medication 216 GRAM(S): at 18:22

## 2022-05-12 NOTE — OB RN PATIENT PROFILE - NS_OBGYNHISTORY_OBGYN_ALL_OB_FT
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

## 2022-05-12 NOTE — OB PROVIDER H&P - ASSESSMENT
25yo  @38+0 IOL for PEC and IUGR    Plan  - Admit to LND. Routine Labs. IVF.  - IOL w/ po cytotec and CB  - PEC, f/u HELLP labs  - Fetus: cat 1 tracing. VTX. EFW extrapolated to 2542g by sono. Continuous EFM. No concerns.  - Prenatal issues: IUGR 4%  - GBS pos, will start ampicillin  - Pain: epidural PRN    Patient discussed with attending physician, Dr. Benson.  DEANNA Wheeler, PGY1

## 2022-05-12 NOTE — OB PROVIDER H&P - HISTORY OF PRESENT ILLNESS
HPI: 25yo F  @  presents for . +FM. -LOF. -CTXs. -VB. Pt denies any other concerns.    PNC: Denies prenatal issues. GBS .  EFW g by sono.    OBHx:   GynHx: denies hx STIs, fibroids, polyps, cysts  PMH: denies hx clotting or bleeding disorders, HTN, DM  PSH: denies  PFH: no hx congential disorders, bleeding/clotting disorders  Psych: denies   Social: denies etoh, smoking, drugs. Safe at home/in relationship.  Meds: PNV   Allergies: NKDA  Will accept blood transfusions? Yes      Gen: NAD  CV: RRR  Pulm: breathing comfortably on RA  Abd: gravid, nontender  Extr: moving all extremities with ease  – FS:   – Spec: pooling, nitrazine, ferning, bleeding,  (lesions if patient with HSV2 history)  – VE: //  – FHT Cat I: baseline 1, mod variability, +accels, -decels  – [place]: qmin  – Sono: vertex      No prenatal issues. GBS _.    Plan  - Admit to LND. Routine Labs. IVF.  - Expectant management/IOL w/  - Fetus: cat 1 tracing. VTX. EFW g by sono. Continuous EFM. No concerns.  - Prenatal issues: none  - GBS pos, will start ampicilin  - Pain: epidural PRN    Patient discussed with attending physician, Dr. Wandy medrano HPI: 27yo F  @38+0 presents for scheduled IOL for PEC. and IUGR 4%, AC 2%. +FM. -LOF. -CTXs. -VB. Patient reports positive HSV serology but never had a genital or oral outbreak. She was on Valtrex until last week.     GBS pos.  EFW 2142g by jaelyn on .    OBHx: mAb s/p D&C   GynHx: small fibroids not mentioned in ATU rimma. Denies h/o abnormal paps, STIs, cysts  PMH: denies hx clotting or bleeding disorders, HTN, DM  PSH: denies  PFH: no hx congential disorders, bleeding/clotting disorders  Psych: denies   Social: denies etoh, smoking, drugs. Safe at home/in relationship.  Meds: ASA, PNV, Iron  Allergies: NKDA  Will accept blood transfusions? Yes

## 2022-05-12 NOTE — CHART NOTE - NSCHARTNOTEFT_GEN_A_CORE
thorough chart review performed  patient does not meet criteria for PEC  IOL for IUGR per M outpatient consult

## 2022-05-12 NOTE — OB RN PATIENT PROFILE - PRO BLOOD TYPE INFANT
Calcipotriene Pregnancy And Lactation Text: This medication has not been proven safe during pregnancy. It is unknown if this medication is excreted in breast milk. O positive

## 2022-05-12 NOTE — OB PROVIDER H&P - NS_OBGYNHISTORY_OBGYN_ALL_OB_FT
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

## 2022-05-12 NOTE — OB RN PATIENT PROFILE - ALERT: PERTINENT HISTORY
1st Trimester Sonogram/Non Invasive Prenatal Screen (NIPS)/Fetal Non-Stress Test (NST) 1st Trimester Sonogram/20 Week Level II Sonogram/Follow up Sonogram for Growth/Non Invasive Prenatal Screen (NIPS)/Fetal Non-Stress Test (NST)/Ultra Screen at 12 Weeks

## 2022-05-12 NOTE — OB PROVIDER H&P - NSHPPHYSICALEXAM_GEN_ALL_CORE
Gen: NAD  CV: RRR  Pulm: breathing comfortably on RA  Abd: gravid, nontender  Extr: moving all extremities with ease  – VE: 0.5/0/-3  – FHT Cat I: baseline 140, mod variability, +accels, -decels  – [place]: absent  – Sono: vertex

## 2022-05-13 LAB — T PALLIDUM AB TITR SER: NEGATIVE — SIGNIFICANT CHANGE UP

## 2022-05-13 RX ORDER — SODIUM CHLORIDE 9 MG/ML
1000 INJECTION, SOLUTION INTRAVENOUS
Refills: 0 | Status: DISCONTINUED | OUTPATIENT
Start: 2022-05-13 | End: 2022-05-14

## 2022-05-13 RX ORDER — SODIUM CHLORIDE 9 MG/ML
300 INJECTION INTRAMUSCULAR; INTRAVENOUS; SUBCUTANEOUS ONCE
Refills: 0 | Status: DISCONTINUED | OUTPATIENT
Start: 2022-05-13 | End: 2022-05-14

## 2022-05-13 RX ORDER — AMPICILLIN TRIHYDRATE 250 MG
1 CAPSULE ORAL EVERY 4 HOURS
Refills: 0 | Status: DISCONTINUED | OUTPATIENT
Start: 2022-05-13 | End: 2022-05-14

## 2022-05-13 RX ORDER — BUTORPHANOL TARTRATE 2 MG/ML
2 INJECTION, SOLUTION INTRAMUSCULAR; INTRAVENOUS ONCE
Refills: 0 | Status: DISCONTINUED | OUTPATIENT
Start: 2022-05-13 | End: 2022-05-13

## 2022-05-13 RX ORDER — SODIUM CHLORIDE 9 MG/ML
1000 INJECTION INTRAMUSCULAR; INTRAVENOUS; SUBCUTANEOUS
Refills: 0 | Status: DISCONTINUED | OUTPATIENT
Start: 2022-05-13 | End: 2022-05-14

## 2022-05-13 RX ORDER — OXYTOCIN 10 UNIT/ML
VIAL (ML) INJECTION
Qty: 30 | Refills: 0 | Status: DISCONTINUED | OUTPATIENT
Start: 2022-05-13 | End: 2022-05-14

## 2022-05-13 RX ORDER — LORATADINE 10 MG/1
10 TABLET ORAL DAILY
Refills: 0 | Status: DISCONTINUED | OUTPATIENT
Start: 2022-05-13 | End: 2022-05-15

## 2022-05-13 RX ADMIN — Medication 2 MILLIUNIT(S)/MIN: at 09:00

## 2022-05-13 RX ADMIN — Medication 108 GRAM(S): at 08:28

## 2022-05-13 RX ADMIN — LORATADINE 10 MILLIGRAM(S): 10 TABLET ORAL at 09:44

## 2022-05-13 RX ADMIN — Medication 108 GRAM(S): at 22:28

## 2022-05-13 RX ADMIN — BUTORPHANOL TARTRATE 2 MILLIGRAM(S): 2 INJECTION, SOLUTION INTRAMUSCULAR; INTRAVENOUS at 03:37

## 2022-05-13 RX ADMIN — Medication 108 GRAM(S): at 12:37

## 2022-05-13 RX ADMIN — BUTORPHANOL TARTRATE 2 MILLIGRAM(S): 2 INJECTION, SOLUTION INTRAMUSCULAR; INTRAVENOUS at 03:42

## 2022-05-13 RX ADMIN — Medication 108 GRAM(S): at 17:52

## 2022-05-13 RX ADMIN — Medication 108 GRAM(S): at 03:00

## 2022-05-13 NOTE — CHART NOTE - NSCHARTNOTEFT_GEN_A_CORE
Patient was evaluated at bedside   ve: 7/80/-2  AROM/ IUPC   Category 1 tracing   continue Pitocin augmentation     anticipate vaginal delivery

## 2022-05-14 ENCOUNTER — TRANSCRIPTION ENCOUNTER (OUTPATIENT)
Age: 27
End: 2022-05-14

## 2022-05-14 RX ORDER — LANOLIN
1 OINTMENT (GRAM) TOPICAL EVERY 6 HOURS
Refills: 0 | Status: DISCONTINUED | OUTPATIENT
Start: 2022-05-14 | End: 2022-05-15

## 2022-05-14 RX ORDER — OXYCODONE HYDROCHLORIDE 5 MG/1
5 TABLET ORAL ONCE
Refills: 0 | Status: DISCONTINUED | OUTPATIENT
Start: 2022-05-14 | End: 2022-05-15

## 2022-05-14 RX ORDER — SIMETHICONE 80 MG/1
80 TABLET, CHEWABLE ORAL EVERY 4 HOURS
Refills: 0 | Status: DISCONTINUED | OUTPATIENT
Start: 2022-05-14 | End: 2022-05-15

## 2022-05-14 RX ORDER — IBUPROFEN 200 MG
600 TABLET ORAL EVERY 6 HOURS
Refills: 0 | Status: DISCONTINUED | OUTPATIENT
Start: 2022-05-14 | End: 2022-05-15

## 2022-05-14 RX ORDER — BENZOCAINE 10 %
1 GEL (GRAM) MUCOUS MEMBRANE EVERY 6 HOURS
Refills: 0 | Status: DISCONTINUED | OUTPATIENT
Start: 2022-05-14 | End: 2022-05-15

## 2022-05-14 RX ORDER — DIPHENHYDRAMINE HCL 50 MG
25 CAPSULE ORAL EVERY 6 HOURS
Refills: 0 | Status: DISCONTINUED | OUTPATIENT
Start: 2022-05-14 | End: 2022-05-15

## 2022-05-14 RX ORDER — OXYCODONE HYDROCHLORIDE 5 MG/1
5 TABLET ORAL
Refills: 0 | Status: DISCONTINUED | OUTPATIENT
Start: 2022-05-14 | End: 2022-05-15

## 2022-05-14 RX ORDER — KETOROLAC TROMETHAMINE 30 MG/ML
30 SYRINGE (ML) INJECTION ONCE
Refills: 0 | Status: DISCONTINUED | OUTPATIENT
Start: 2022-05-14 | End: 2022-05-15

## 2022-05-14 RX ORDER — MAGNESIUM HYDROXIDE 400 MG/1
30 TABLET, CHEWABLE ORAL
Refills: 0 | Status: DISCONTINUED | OUTPATIENT
Start: 2022-05-14 | End: 2022-05-15

## 2022-05-14 RX ORDER — AER TRAVELER 0.5 G/1
1 SOLUTION RECTAL; TOPICAL EVERY 4 HOURS
Refills: 0 | Status: DISCONTINUED | OUTPATIENT
Start: 2022-05-14 | End: 2022-05-15

## 2022-05-14 RX ORDER — DIBUCAINE 1 %
1 OINTMENT (GRAM) RECTAL EVERY 6 HOURS
Refills: 0 | Status: DISCONTINUED | OUTPATIENT
Start: 2022-05-14 | End: 2022-05-15

## 2022-05-14 RX ORDER — IBUPROFEN 200 MG
600 TABLET ORAL EVERY 6 HOURS
Refills: 0 | Status: COMPLETED | OUTPATIENT
Start: 2022-05-14 | End: 2023-04-12

## 2022-05-14 RX ORDER — ACETAMINOPHEN 500 MG
975 TABLET ORAL
Refills: 0 | Status: DISCONTINUED | OUTPATIENT
Start: 2022-05-14 | End: 2022-05-15

## 2022-05-14 RX ORDER — LORATADINE 10 MG/1
1 TABLET ORAL
Qty: 0 | Refills: 0 | DISCHARGE
Start: 2022-05-14

## 2022-05-14 RX ORDER — SODIUM CHLORIDE 9 MG/ML
3 INJECTION INTRAMUSCULAR; INTRAVENOUS; SUBCUTANEOUS EVERY 8 HOURS
Refills: 0 | Status: DISCONTINUED | OUTPATIENT
Start: 2022-05-14 | End: 2022-05-15

## 2022-05-14 RX ORDER — IBUPROFEN 200 MG
1 TABLET ORAL
Qty: 0 | Refills: 0 | DISCHARGE
Start: 2022-05-14

## 2022-05-14 RX ORDER — PRAMOXINE HYDROCHLORIDE 150 MG/15G
1 AEROSOL, FOAM RECTAL EVERY 4 HOURS
Refills: 0 | Status: DISCONTINUED | OUTPATIENT
Start: 2022-05-14 | End: 2022-05-15

## 2022-05-14 RX ORDER — TETANUS TOXOID, REDUCED DIPHTHERIA TOXOID AND ACELLULAR PERTUSSIS VACCINE, ADSORBED 5; 2.5; 8; 8; 2.5 [IU]/.5ML; [IU]/.5ML; UG/.5ML; UG/.5ML; UG/.5ML
0.5 SUSPENSION INTRAMUSCULAR ONCE
Refills: 0 | Status: DISCONTINUED | OUTPATIENT
Start: 2022-05-14 | End: 2022-05-15

## 2022-05-14 RX ORDER — OXYTOCIN 10 UNIT/ML
333.33 VIAL (ML) INJECTION
Qty: 20 | Refills: 0 | Status: DISCONTINUED | OUTPATIENT
Start: 2022-05-14 | End: 2022-05-15

## 2022-05-14 RX ORDER — HYDROCORTISONE 1 %
1 OINTMENT (GRAM) TOPICAL EVERY 6 HOURS
Refills: 0 | Status: DISCONTINUED | OUTPATIENT
Start: 2022-05-14 | End: 2022-05-15

## 2022-05-14 RX ADMIN — SODIUM CHLORIDE 3 MILLILITER(S): 9 INJECTION INTRAMUSCULAR; INTRAVENOUS; SUBCUTANEOUS at 23:38

## 2022-05-14 RX ADMIN — LORATADINE 10 MILLIGRAM(S): 10 TABLET ORAL at 18:26

## 2022-05-14 RX ADMIN — SODIUM CHLORIDE 3 MILLILITER(S): 9 INJECTION INTRAMUSCULAR; INTRAVENOUS; SUBCUTANEOUS at 14:58

## 2022-05-14 RX ADMIN — Medication 108 GRAM(S): at 02:33

## 2022-05-14 RX ADMIN — Medication 1 TABLET(S): at 13:18

## 2022-05-14 RX ADMIN — Medication 108 GRAM(S): at 06:26

## 2022-05-14 NOTE — DISCHARGE NOTE OB - PATIENT PORTAL LINK FT
You can access the FollowMyHealth Patient Portal offered by [facility] by registering at the following website: [email or web address]. By joining [employer]’s FollowMyHealth portal, you will also be able to view your health information using other applications (apps) compatible with our system.

## 2022-05-14 NOTE — OB RN DELIVERY SUMMARY - NSSELHIDDEN_OBGYN_ALL_OB_FT
[NS_DeliveryAttending1_OBGYN_ALL_OB_FT:[id number]],[NS_DeliveryRN_OBGYN_ALL_OB_FT:[id number]]

## 2022-05-14 NOTE — OB PROVIDER DELIVERY SUMMARY - NSPROVIDERDELIVERYNOTE_OBGYN_ALL_OB_FT
, viable female , APGARS 9/9, nuchal cordx1   2nd degree perineal laceration repaired with chromic suture   ebl 200 cc   GHTN diagnosed intrapartum

## 2022-05-14 NOTE — DISCHARGE NOTE OB - MEDICATION SUMMARY - MEDICATIONS TO TAKE
I will START or STAY ON the medications listed below when I get home from the hospital:    ibuprofen 600 mg oral tablet  -- 1 tab(s) by mouth every 6 hours  -- Indication: For pain     loratadine 10 mg oral tablet  -- 1 tab(s) by mouth once a day  -- Indication: For allergies    Prenatal 1 oral capsule  -- Indication: For Vitamin     Iron 100 Plus oral tablet  -- 1 tab(s) by mouth once a day  -- Indication: For anemia

## 2022-05-14 NOTE — DISCHARGE NOTE OB - CARE PROVIDER_API CALL
Napoleon Lara)  Obstetrics and Gynecology  [address] [place] [address]  Phone: [phone]  Fax: [phone]  Follow Up Time:

## 2022-05-14 NOTE — LACTATION INITIAL EVALUATION - LACTATION INTERVENTIONS
initiate/review safe skin-to-skin/initiate/review hand expression/initiate/review techniques for position and latch/review techniques to increase milk supply/review techniques to manage sore nipples/engorgement/initiate/review finger suck/initiate/review breast massage/compression/reviewed components of an effective feeding and at least 8 effective feedings per day required/reviewed importance of monitoring infant diapers, the breastfeeding log, and minimum output each day/reviewed risks of unnecessary formula supplementation/reviewed risks of artificial nipples/reviewed benefits and recommendations for rooming in/reviewed feeding on demand/by cue at least 8 times a day/recommended follow-up with pediatrician within 24 hours of discharge/reviewed indications of inadequate milk transfer that would require supplementation

## 2022-05-14 NOTE — OB PROVIDER DELIVERY SUMMARY - NSSELHIDDEN_OBGYN_ALL_OB_FT
[NS_DeliveryAttending1_OBGYN_ALL_OB_FT:[id number]],[NS_DeliveryRN_OBGYN_ALL_OB_FT:[id number]]

## 2022-05-14 NOTE — OB PROVIDER LABOR PROGRESS NOTE - ASSESSMENT
25yo  @38+1 IOL for IUGR 4%    - SVE: -3  - T Cat 1, reassuring, ctm  - Call anesthesia for epidural  - Consider switching to pitocin    d/w Dr. Nora Wheeler, PGY1
- CB placed without difficulty. Pt tolerated well.     Cande Juarez, PGY1  d/w Dr. Sykes 
top off  continue mariela Bajwa, PGY-3  d/w Dr. Lara
#Labor   - Oxytocin briefly paused w/ spontaneous improvement. Will re-start Oxytocin    #Fetal Wellbeing   - Cat 2. Will resuscitate PRN. Consider amnioinfusion if variables persist    # Issues   - Continue to monitor BPs    #Pain Control   - w/ Epi    Catarino Coronado, PGY-1    Plan per Dr. Melendez 
- c/w  cytotec     Cande Juarez, PGY1  d/w Dr. Sykes 
A/P:   26y  @ 38.1wga admitted for IOL 2/2 to gHtn and IUGR    #Labor   - s/p PO and CRB  - c/w Oxytocin  - Pt AROM'ed w/ clear fluid. IUPC placed (@1227) w/ clear fluid flash    #Fetal Wellbeing   - Cat 1    # Issues   - Will continue to monitor BPs     #Pain Control   - w/ Epi    Catarino Coronado, PGY-1    d/w and plan per Dr. Melendez 
- tracing overall reassuring   - c/w pitocin   - ISE placed without difficulty     Cande Juarez, PGY1
A/P:   - Labor: IOL for gHTN and IUGR. On Pit. AROM with AI going.   - Fetus: cat 1  - GBS: + on amp  - Pain: epi in situ     Stefani Hodgson, PGY-2  d/w Dr Lara

## 2022-05-14 NOTE — OB RN DELIVERY SUMMARY - NS_SEPSISRSKCALC_OBGYN_ALL_OB_FT
EOS calculated successfully. EOS Risk Factor: 0.5/1000 live births ([facility] national incidence); GA=38w2d; Temp=98.78; ROM=18.55; GBS='Positive'; Antibiotics='Broad spectrum antibiotics > 4 hrs prior to birth'

## 2022-05-14 NOTE — OB PROVIDER LABOR PROGRESS NOTE - NS_SUBJECTIVE/OBJECTIVE_OBGYN_ALL_OB_FT
PGY1 Labor & Delivery Progress Note     Pt examined @1341 due to cat 2 tracing    T(C): 36.7 (05-13-22 @ 12:28), Max: 36.7 (05-12-22 @ 16:48)  HR: 73 (05-13-22 @ 13:44) (63 - 100)  BP: 153/84 (05-13-22 @ 13:27) (116/65 - 162/91)  RR: 16 (05-13-22 @ 12:28) (15 - 18)  SpO2: 96% (05-13-22 @ 13:44) (92% - 100%)
VE to place ISE due to inability to trace in the setting of a category II tracing
Patient feeling more pressure, asking for epidural
R2 OB Labor Note    S: Patient seen and examined at bedside.     T(C): 36.6 (05-13-22 @ 17:54), Max: 36.7 (05-13-22 @ 05:00)  HR: 88 (05-13-22 @ 19:59) (63 - 100)  BP: 151/84 (05-13-22 @ 19:56) (116/65 - 162/91)  BP(mean): --  ABP: --  ABP(mean): --  RR: 16 (05-13-22 @ 17:54) (15 - 16)  SpO2: 98% (05-13-22 @ 19:59) (90% - 100%)  Wt(kg): --  CVP(mm Hg): --  CI: --  CAPILLARY BLOOD GLUCOSE       N/A      05-12 @ 07:01  -  05-13 @ 07:00  --------------------------------------------------------  IN:    Lactated Ringers: 1125 mL  Total IN: 1125 mL    OUT:  Total OUT: 0 mL    Total NET: 1125 mL      05-13 @ 07:01  -  05-13 @ 20:04  --------------------------------------------------------  IN:    Lactated Ringers: 2000 mL  Total IN: 2000 mL    OUT:    Indwelling Catheter - Urethral (mL): 850 mL  Total OUT: 850 mL    Total NET: 1150 mL
VE due to cervical balloon falling out
PGY1 Labor & Delivery Progress Note     Pt examined @1148 to assess for cervical change     T(C): 36.7 (05-13-22 @ 09:12), Max: 36.7 (05-12-22 @ 16:48)  HR: 72 (05-13-22 @ 12:27) (63 - 100)  BP: 162/91 (05-13-22 @ 12:27) (116/65 - 162/91)  RR: 15 (05-13-22 @ 09:12) (15 - 18)  SpO2: 98% (05-13-22 @ 12:14) (93% - 100%)
VE to place cervical balloon
pt examined for increased rectal pressure

## 2022-05-14 NOTE — OB PROVIDER LABOR PROGRESS NOTE - NS_OBIHIFHRDETAILS_OBGYN_ALL_OB_FT
130s, moderate variaiblity, accels, intermittent decelerations, overall reassuring
120/mod/(-)accels/(+)variable decels
130s, moderate variability, accels, no decels
Cat 1: 120/mod variability/ + accels/ - decels
125/mod/(-)accels/(-)decels
125/MOD/+ACELS
130s, moderate variability, accels, no decels
intermittent variables

## 2022-05-14 NOTE — OB PROVIDER LABOR PROGRESS NOTE - NSVAGINALEXAM_OBGYN_ALL_OB_DT
13-May-2022 11:48
13-May-2022 03:03
14-May-2022 04:36
12-May-2022 23:57
13-May-2022 07:15
13-May-2022 22:10
13-May-2022 20:04
13-May-2022 13:41

## 2022-05-15 VITALS
SYSTOLIC BLOOD PRESSURE: 139 MMHG | DIASTOLIC BLOOD PRESSURE: 91 MMHG | RESPIRATION RATE: 18 BRPM | TEMPERATURE: 98 F | HEART RATE: 91 BPM | OXYGEN SATURATION: 100 %

## 2022-05-15 RX ADMIN — LORATADINE 10 MILLIGRAM(S): 10 TABLET ORAL at 11:47

## 2022-05-15 RX ADMIN — Medication 1 TABLET(S): at 11:47

## 2022-05-15 RX ADMIN — SODIUM CHLORIDE 3 MILLILITER(S): 9 INJECTION INTRAMUSCULAR; INTRAVENOUS; SUBCUTANEOUS at 05:47

## 2022-05-15 NOTE — PROGRESS NOTE ADULT - SUBJECTIVE AND OBJECTIVE BOX
S: 25yo PPD#1 s/p .  OBHx: GabyTN HELMADELINE WNL.  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  Patient feels well. Pain is well controlled. She is tolerating a regular diet and passing flatus. She is voiding spontaneously, and ambulating without difficulty. Denies CP/SOB. Denies lightheadedness/dizziness. Denies N/V.    O:  Vitals:  Vital Signs Last 24 Hrs  T(C): 36.8 (15 May 2022 06:33), Max: 37.5 (14 May 2022 22:00)  T(F): 98.2 (15 May 2022 06:33), Max: 99.5 (14 May 2022 22:00)  HR: 85 (15 May 2022 06:33) (85 - 100)  BP: 123/68 (15 May 2022 06:33) (120/80 - 136/67)  BP(mean): --  RR: 16 (15 May 2022 06:33) (16 - 18)  SpO2: 100% (15 May 2022 06:33) (100% - 100%)    MEDICATIONS  (STANDING):  acetaminophen     Tablet .. 975 milliGRAM(s) Oral <User Schedule>  diphtheria/tetanus/pertussis (acellular) Vaccine (ADAcel) 0.5 milliLiter(s) IntraMuscular once  ibuprofen  Tablet. 600 milliGRAM(s) Oral every 6 hours  ketorolac   Injectable 30 milliGRAM(s) IV Push once  loratadine 10 milliGRAM(s) Oral daily  oxytocin Infusion 333.333 milliUNIT(s)/Min (1000 mL/Hr) IV Continuous <Continuous>  oxytocin Infusion 333.333 milliUNIT(s)/Min (1000 mL/Hr) IV Continuous <Continuous>  prenatal multivitamin 1 Tablet(s) Oral daily  sodium chloride 0.9% lock flush 3 milliLiter(s) IV Push every 8 hours      Labs:  Blood type: O Positive  Rubella IgG: RPR: Negative                          10.1<L>   7.36 >-----------< 345    (  @ 17:30 )             30.2<L>    22 @ 17:30      135  |  105  |  6<L>  ----------------------------<  82  3.4<L>   |  19<L>  |  0.52        Ca    10.0      12 May 2022 17:30    TPro  7.2  /  Alb  3.6  /  TBili  0.3  /  DBili  x   /  AST  10  /  ALT  5   /  AlkPhos  162<H>  22 @ 17:30          Physical Exam:  General: NAD  Abdomen: soft, non-tender, non-distended, fundus firm  Vaginal: Lochia wnl  Extremities: No calf tenderness    A/P: 25yo PPD#1 s/p .  Patient is stable and doing well post-partum.   - Pain well controlled, continue current pain regimen  - Increase ambulation, SCDs when not ambulating  - Continue regular diet  -gHTN BPs normotensive in postpartum  - BP cuff Rx sent to [facility] in [place]  -instructions given on BP monitoring; TID; call MD office if greater than 140/90; report to triage is greater than 160/110  -reviewed signs and symptoms related to preeclampsia with patient such as HA, Change in vision, N/V. RUQ pain   -keep log BP's to present to MD during appointment or triage   -All questions answered, patient verbalized understanding   -Discharge planning    Shikha JEWELL
Patient seen today  overall well after .  labs and vitals reviewed they are stable currently  plan for DC tomorrow

## 2022-05-16 ENCOUNTER — APPOINTMENT (OUTPATIENT)
Dept: ANTEPARTUM | Facility: CLINIC | Age: 27
End: 2022-05-16

## 2022-05-16 ENCOUNTER — NON-APPOINTMENT (OUTPATIENT)
Age: 27
End: 2022-05-16

## 2022-05-18 ENCOUNTER — NON-APPOINTMENT (OUTPATIENT)
Age: 27
End: 2022-05-18

## 2022-05-18 ENCOUNTER — INPATIENT (INPATIENT)
Facility: HOSPITAL | Age: 27
LOS: 5 days | Discharge: HOME | End: 2022-05-24
Attending: STUDENT IN AN ORGANIZED HEALTH CARE EDUCATION/TRAINING PROGRAM | Admitting: STUDENT IN AN ORGANIZED HEALTH CARE EDUCATION/TRAINING PROGRAM
Payer: COMMERCIAL

## 2022-05-18 VITALS — SYSTOLIC BLOOD PRESSURE: 141 MMHG | TEMPERATURE: 98 F | DIASTOLIC BLOOD PRESSURE: 96 MMHG | HEART RATE: 125 BPM

## 2022-05-18 DIAGNOSIS — O13.9 GESTATIONAL [PREGNANCY-INDUCED] HYPERTENSION WITHOUT SIGNIFICANT PROTEINURIA, UNSPECIFIED TRIMESTER: ICD-10-CM

## 2022-05-18 DIAGNOSIS — O14.90 UNSPECIFIED PRE-ECLAMPSIA, UNSPECIFIED TRIMESTER: ICD-10-CM

## 2022-05-18 DIAGNOSIS — R09.89 OTHER SPECIFIED SYMPTOMS AND SIGNS INVOLVING THE CIRCULATORY AND RESPIRATORY SYSTEMS: ICD-10-CM

## 2022-05-18 LAB
ALBUMIN SERPL ELPH-MCNC: 3.6 G/DL — SIGNIFICANT CHANGE UP (ref 3.3–5)
ALP SERPL-CCNC: 136 U/L — HIGH (ref 40–120)
ALT FLD-CCNC: 49 U/L — HIGH (ref 4–33)
ANION GAP SERPL CALC-SCNC: 16 MMOL/L — HIGH (ref 7–14)
APPEARANCE UR: CLEAR — SIGNIFICANT CHANGE UP
APTT BLD: 30 SEC — SIGNIFICANT CHANGE UP (ref 27–36.3)
AST SERPL-CCNC: 34 U/L — HIGH (ref 4–32)
B PERT DNA SPEC QL NAA+PROBE: SIGNIFICANT CHANGE UP
B PERT+PARAPERT DNA PNL SPEC NAA+PROBE: SIGNIFICANT CHANGE UP
BACTERIA # UR AUTO: ABNORMAL
BASOPHILS # BLD AUTO: 0.03 K/UL — SIGNIFICANT CHANGE UP (ref 0–0.2)
BASOPHILS NFR BLD AUTO: 0.4 % — SIGNIFICANT CHANGE UP (ref 0–2)
BILIRUB SERPL-MCNC: 0.4 MG/DL — SIGNIFICANT CHANGE UP (ref 0.2–1.2)
BILIRUB UR-MCNC: NEGATIVE — SIGNIFICANT CHANGE UP
BORDETELLA PARAPERTUSSIS (RAPRVP): SIGNIFICANT CHANGE UP
BUN SERPL-MCNC: 7 MG/DL — SIGNIFICANT CHANGE UP (ref 7–23)
C PNEUM DNA SPEC QL NAA+PROBE: SIGNIFICANT CHANGE UP
CALCIUM SERPL-MCNC: 9.5 MG/DL — SIGNIFICANT CHANGE UP (ref 8.4–10.5)
CHLORIDE SERPL-SCNC: 106 MMOL/L — SIGNIFICANT CHANGE UP (ref 98–107)
CO2 SERPL-SCNC: 18 MMOL/L — LOW (ref 22–31)
COLOR SPEC: SIGNIFICANT CHANGE UP
CREAT ?TM UR-MCNC: 76 MG/DL — SIGNIFICANT CHANGE UP
CREAT SERPL-MCNC: 0.56 MG/DL — SIGNIFICANT CHANGE UP (ref 0.5–1.3)
DIFF PNL FLD: ABNORMAL
EGFR: 127 ML/MIN/1.73M2 — SIGNIFICANT CHANGE UP
EOSINOPHIL # BLD AUTO: 0.16 K/UL — SIGNIFICANT CHANGE UP (ref 0–0.5)
EOSINOPHIL NFR BLD AUTO: 2.1 % — SIGNIFICANT CHANGE UP (ref 0–6)
EPI CELLS # UR: 1 /HPF — SIGNIFICANT CHANGE UP (ref 0–5)
FIBRINOGEN PPP-MCNC: 619 MG/DL — HIGH (ref 330–520)
FLUAV SUBTYP SPEC NAA+PROBE: SIGNIFICANT CHANGE UP
FLUBV RNA SPEC QL NAA+PROBE: SIGNIFICANT CHANGE UP
GLUCOSE SERPL-MCNC: 71 MG/DL — SIGNIFICANT CHANGE UP (ref 70–99)
GLUCOSE UR QL: NEGATIVE — SIGNIFICANT CHANGE UP
HADV DNA SPEC QL NAA+PROBE: SIGNIFICANT CHANGE UP
HCOV 229E RNA SPEC QL NAA+PROBE: SIGNIFICANT CHANGE UP
HCOV HKU1 RNA SPEC QL NAA+PROBE: SIGNIFICANT CHANGE UP
HCOV NL63 RNA SPEC QL NAA+PROBE: SIGNIFICANT CHANGE UP
HCOV OC43 RNA SPEC QL NAA+PROBE: SIGNIFICANT CHANGE UP
HCT VFR BLD CALC: 31.5 % — LOW (ref 34.5–45)
HGB BLD-MCNC: 10.4 G/DL — LOW (ref 11.5–15.5)
HMPV RNA SPEC QL NAA+PROBE: SIGNIFICANT CHANGE UP
HPIV1 RNA SPEC QL NAA+PROBE: SIGNIFICANT CHANGE UP
HPIV2 RNA SPEC QL NAA+PROBE: SIGNIFICANT CHANGE UP
HPIV3 RNA SPEC QL NAA+PROBE: DETECTED
HPIV4 RNA SPEC QL NAA+PROBE: SIGNIFICANT CHANGE UP
HYALINE CASTS # UR AUTO: 2 /LPF — SIGNIFICANT CHANGE UP (ref 0–7)
IANC: 4.66 K/UL — SIGNIFICANT CHANGE UP (ref 1.8–7.4)
IMM GRANULOCYTES NFR BLD AUTO: 0.8 % — SIGNIFICANT CHANGE UP (ref 0–1.5)
INR BLD: 1.06 RATIO — SIGNIFICANT CHANGE UP (ref 0.88–1.16)
KETONES UR-MCNC: ABNORMAL
LDH SERPL L TO P-CCNC: 310 U/L — HIGH (ref 135–225)
LEUKOCYTE ESTERASE UR-ACNC: ABNORMAL
LYMPHOCYTES # BLD AUTO: 1.82 K/UL — SIGNIFICANT CHANGE UP (ref 1–3.3)
LYMPHOCYTES # BLD AUTO: 23.8 % — SIGNIFICANT CHANGE UP (ref 13–44)
M PNEUMO DNA SPEC QL NAA+PROBE: SIGNIFICANT CHANGE UP
MCHC RBC-ENTMCNC: 32.3 PG — SIGNIFICANT CHANGE UP (ref 27–34)
MCHC RBC-ENTMCNC: 33 GM/DL — SIGNIFICANT CHANGE UP (ref 32–36)
MCV RBC AUTO: 97.8 FL — SIGNIFICANT CHANGE UP (ref 80–100)
MONOCYTES # BLD AUTO: 0.91 K/UL — HIGH (ref 0–0.9)
MONOCYTES NFR BLD AUTO: 11.9 % — SIGNIFICANT CHANGE UP (ref 2–14)
NEUTROPHILS # BLD AUTO: 4.66 K/UL — SIGNIFICANT CHANGE UP (ref 1.8–7.4)
NEUTROPHILS NFR BLD AUTO: 61 % — SIGNIFICANT CHANGE UP (ref 43–77)
NITRITE UR-MCNC: NEGATIVE — SIGNIFICANT CHANGE UP
NRBC # BLD: 0 /100 WBCS — SIGNIFICANT CHANGE UP
NRBC # FLD: 0 K/UL — SIGNIFICANT CHANGE UP
PH UR: 6.5 — SIGNIFICANT CHANGE UP (ref 5–8)
PLATELET # BLD AUTO: 378 K/UL — SIGNIFICANT CHANGE UP (ref 150–400)
POTASSIUM SERPL-MCNC: 4 MMOL/L — SIGNIFICANT CHANGE UP (ref 3.5–5.3)
POTASSIUM SERPL-SCNC: 4 MMOL/L — SIGNIFICANT CHANGE UP (ref 3.5–5.3)
PROT ?TM UR-MCNC: 53 MG/DL — SIGNIFICANT CHANGE UP
PROT ?TM UR-MCNC: 53 MG/DL — SIGNIFICANT CHANGE UP
PROT SERPL-MCNC: 7.2 G/DL — SIGNIFICANT CHANGE UP (ref 6–8.3)
PROT UR-MCNC: ABNORMAL
PROT/CREAT UR-RTO: 0.7 RATIO — HIGH (ref 0–0.2)
PROTHROM AB SERPL-ACNC: 12.3 SEC — SIGNIFICANT CHANGE UP (ref 10.5–13.4)
RAPID RVP RESULT: DETECTED
RBC # BLD: 3.22 M/UL — LOW (ref 3.8–5.2)
RBC # FLD: 14.3 % — SIGNIFICANT CHANGE UP (ref 10.3–14.5)
RBC CASTS # UR COMP ASSIST: 10 /HPF — HIGH (ref 0–4)
RSV RNA SPEC QL NAA+PROBE: SIGNIFICANT CHANGE UP
RV+EV RNA SPEC QL NAA+PROBE: SIGNIFICANT CHANGE UP
SARS-COV-2 RNA SPEC QL NAA+PROBE: SIGNIFICANT CHANGE UP
SODIUM SERPL-SCNC: 140 MMOL/L — SIGNIFICANT CHANGE UP (ref 135–145)
SP GR SPEC: 1.02 — SIGNIFICANT CHANGE UP (ref 1–1.05)
URATE SERPL-MCNC: 6.6 MG/DL — SIGNIFICANT CHANGE UP (ref 2.5–7)
UROBILINOGEN FLD QL: SIGNIFICANT CHANGE UP
WBC # BLD: 7.64 K/UL — SIGNIFICANT CHANGE UP (ref 3.8–10.5)
WBC # FLD AUTO: 7.64 K/UL — SIGNIFICANT CHANGE UP (ref 3.8–10.5)
WBC UR QL: 10 /HPF — HIGH (ref 0–5)

## 2022-05-18 PROCEDURE — 93010 ELECTROCARDIOGRAM REPORT: CPT | Mod: 76

## 2022-05-18 PROCEDURE — 71045 X-RAY EXAM CHEST 1 VIEW: CPT | Mod: 26

## 2022-05-18 PROCEDURE — 71275 CT ANGIOGRAPHY CHEST: CPT | Mod: 26

## 2022-05-18 RX ORDER — ACETAMINOPHEN 500 MG
975 TABLET ORAL ONCE
Refills: 0 | Status: COMPLETED | OUTPATIENT
Start: 2022-05-18 | End: 2022-05-19

## 2022-05-18 RX ORDER — NIFEDIPINE 30 MG
30 TABLET, EXTENDED RELEASE 24 HR ORAL ONCE
Refills: 0 | Status: COMPLETED | OUTPATIENT
Start: 2022-05-18 | End: 2022-05-18

## 2022-05-18 RX ORDER — FUROSEMIDE 40 MG
20 TABLET ORAL ONCE
Refills: 0 | Status: COMPLETED | OUTPATIENT
Start: 2022-05-18 | End: 2022-05-18

## 2022-05-18 RX ORDER — HEPARIN SODIUM 5000 [USP'U]/ML
5000 INJECTION INTRAVENOUS; SUBCUTANEOUS EVERY 12 HOURS
Refills: 0 | Status: DISCONTINUED | OUTPATIENT
Start: 2022-05-18 | End: 2022-05-24

## 2022-05-18 RX ORDER — HYDRALAZINE HCL 50 MG
10 TABLET ORAL ONCE
Refills: 0 | Status: COMPLETED | OUTPATIENT
Start: 2022-05-18 | End: 2022-05-18

## 2022-05-18 RX ORDER — NIFEDIPINE 30 MG
10 TABLET, EXTENDED RELEASE 24 HR ORAL ONCE
Refills: 0 | Status: COMPLETED | OUTPATIENT
Start: 2022-05-18 | End: 2022-05-18

## 2022-05-18 RX ORDER — LEVETIRACETAM 250 MG/1
500 TABLET, FILM COATED ORAL EVERY 12 HOURS
Refills: 0 | Status: COMPLETED | OUTPATIENT
Start: 2022-05-18 | End: 2022-05-19

## 2022-05-18 RX ORDER — LABETALOL HCL 100 MG
200 TABLET ORAL EVERY 8 HOURS
Refills: 0 | Status: DISCONTINUED | OUTPATIENT
Start: 2022-05-18 | End: 2022-05-19

## 2022-05-18 RX ADMIN — Medication 20 MILLIGRAM(S): at 22:53

## 2022-05-18 RX ADMIN — Medication 200 MILLIGRAM(S): at 22:10

## 2022-05-18 RX ADMIN — Medication 30 MILLIGRAM(S): at 20:05

## 2022-05-18 RX ADMIN — Medication 10 MILLIGRAM(S): at 19:52

## 2022-05-18 RX ADMIN — Medication 10 MILLIGRAM(S): at 21:02

## 2022-05-18 RX ADMIN — HEPARIN SODIUM 5000 UNIT(S): 5000 INJECTION INTRAVENOUS; SUBCUTANEOUS at 22:09

## 2022-05-18 RX ADMIN — LEVETIRACETAM 400 MILLIGRAM(S): 250 TABLET, FILM COATED ORAL at 21:24

## 2022-05-18 NOTE — H&P ADULT - HISTORY OF PRESENT ILLNESS
PNC  Provider:  Dr Jules    Patient is a 25y/o  P1,  s/p  on 2022  ,presenting  with elevated BPs and + 2 pedal edema  and SOB on exertion.  Reports  headache 3/10.   Denies nausea, vomiting, visual disturbances or RUQ/epigastric  pain.      Patient had elevated BPs gHTN  No Magnesium sulfate IP  No D/C meds.

## 2022-05-18 NOTE — H&P ADULT - ASSESSMENT
Patient is a 27y/o P1  PPday 4 sPEC with SOB   r/o pulmonary edema   admitted for imaging ( CXR CTPA )   cardiac testing  ( EKG ECHO)   HTN management ( procardia hydralazine IVPx1 )  HELLP labs   Covid 19 testing      case d/w Dr Sequeira

## 2022-05-18 NOTE — H&P ADULT - NSHPREVIEWOFSYSTEMS_GEN_ALL_CORE
BP Range:  154- 173/   Heart: tachycardia  Lungs:  Expiratory wheezing in apex. Rhonchi in lt. lower lobe  Abdomen: soft  Lower extremities: +2 bilateral pedal edema.    DTRs : wnl

## 2022-05-18 NOTE — H&P ADULT - NSHPPHYSICALEXAM_GEN_ALL_CORE
Heart: tachycardia  Lungs:  Expiratory wheezing in apex. Rhonchi in lt. lower lobe  Abdomen: soft  Lower extremities: +2 bilateral pedal edema.    DTRs : wnl

## 2022-05-18 NOTE — H&P ADULT - NSHPLABSRESULTS_GEN_ALL_CORE
10.4   7.64  )-----------( 378      ( 18 May 2022 18:57 )             31.5       140  |  106  |  7   ----------------------------<  71  4.0   |  18<L>  |  0.56    Ca    9.5      18 May 2022 18:57    TPro  7.2  /  Alb  3.6  /  TBili  0.4  /  DBili  x   /  AST  34<H>  /  ALT  49<H>  /  AlkPhos  136<H>    Urinalysis Basic - ( 18 May 2022 18:49 )    Color: Light Yellow / Appearance: Clear / S.018 / pH: x  Gluc: x / Ketone: Large  / Bili: Negative / Urobili: <2 mg/dL   Blood: x / Protein: 30 mg/dL / Nitrite: Negative   Leuk Esterase: Large / RBC: 10 /HPF / WBC 10 /HPF   Sq Epi: x / Non Sq Epi: 1 /HPF / Bacteria: Few

## 2022-05-18 NOTE — H&P ADULT - PROBLEM SELECTOR PLAN 2
Admitted for imaging ( CXR CTPA )   cardiac testing  ( EKG ECHO)   HTN management ( procardia hydralazine IVPx1 )  HELLP labs   Covid 19 testing

## 2022-05-19 ENCOUNTER — TRANSCRIPTION ENCOUNTER (OUTPATIENT)
Age: 27
End: 2022-05-19

## 2022-05-19 LAB
ALBUMIN SERPL ELPH-MCNC: 3.2 G/DL — LOW (ref 3.3–5)
ALP SERPL-CCNC: 125 U/L — HIGH (ref 40–120)
ALT FLD-CCNC: 48 U/L — HIGH (ref 4–33)
ANION GAP SERPL CALC-SCNC: 16 MMOL/L — HIGH (ref 7–14)
APTT BLD: 28.5 SEC — SIGNIFICANT CHANGE UP (ref 27–36.3)
AST SERPL-CCNC: 36 U/L — HIGH (ref 4–32)
BASOPHILS # BLD AUTO: 0.03 K/UL — SIGNIFICANT CHANGE UP (ref 0–0.2)
BASOPHILS NFR BLD AUTO: 0.4 % — SIGNIFICANT CHANGE UP (ref 0–2)
BILIRUB SERPL-MCNC: 0.4 MG/DL — SIGNIFICANT CHANGE UP (ref 0.2–1.2)
BLD GP AB SCN SERPL QL: NEGATIVE — SIGNIFICANT CHANGE UP
BUN SERPL-MCNC: 6 MG/DL — LOW (ref 7–23)
CALCIUM SERPL-MCNC: 9.4 MG/DL — SIGNIFICANT CHANGE UP (ref 8.4–10.5)
CHLORIDE SERPL-SCNC: 105 MMOL/L — SIGNIFICANT CHANGE UP (ref 98–107)
CO2 SERPL-SCNC: 14 MMOL/L — LOW (ref 22–31)
CREAT SERPL-MCNC: 0.46 MG/DL — LOW (ref 0.5–1.3)
EGFR: 134 ML/MIN/1.73M2 — SIGNIFICANT CHANGE UP
EOSINOPHIL # BLD AUTO: 0.01 K/UL — SIGNIFICANT CHANGE UP (ref 0–0.5)
EOSINOPHIL NFR BLD AUTO: 0.1 % — SIGNIFICANT CHANGE UP (ref 0–6)
FIBRINOGEN PPP-MCNC: 568 MG/DL — HIGH (ref 330–520)
GLUCOSE SERPL-MCNC: 78 MG/DL — SIGNIFICANT CHANGE UP (ref 70–99)
HCT VFR BLD CALC: 32.6 % — LOW (ref 34.5–45)
HGB BLD-MCNC: 10.8 G/DL — LOW (ref 11.5–15.5)
IANC: 5.32 K/UL — SIGNIFICANT CHANGE UP (ref 1.8–7.4)
IMM GRANULOCYTES NFR BLD AUTO: 1.1 % — SIGNIFICANT CHANGE UP (ref 0–1.5)
INR BLD: 1.07 RATIO — SIGNIFICANT CHANGE UP (ref 0.88–1.16)
LDH SERPL L TO P-CCNC: 320 U/L — HIGH (ref 135–225)
LYMPHOCYTES # BLD AUTO: 1.22 K/UL — SIGNIFICANT CHANGE UP (ref 1–3.3)
LYMPHOCYTES # BLD AUTO: 16.7 % — SIGNIFICANT CHANGE UP (ref 13–44)
MCHC RBC-ENTMCNC: 31.5 PG — SIGNIFICANT CHANGE UP (ref 27–34)
MCHC RBC-ENTMCNC: 33.1 GM/DL — SIGNIFICANT CHANGE UP (ref 32–36)
MCV RBC AUTO: 95 FL — SIGNIFICANT CHANGE UP (ref 80–100)
MONOCYTES # BLD AUTO: 0.65 K/UL — SIGNIFICANT CHANGE UP (ref 0–0.9)
MONOCYTES NFR BLD AUTO: 8.9 % — SIGNIFICANT CHANGE UP (ref 2–14)
NEUTROPHILS # BLD AUTO: 5.32 K/UL — SIGNIFICANT CHANGE UP (ref 1.8–7.4)
NEUTROPHILS NFR BLD AUTO: 72.8 % — SIGNIFICANT CHANGE UP (ref 43–77)
NRBC # BLD: 0 /100 WBCS — SIGNIFICANT CHANGE UP
NRBC # FLD: 0 K/UL — SIGNIFICANT CHANGE UP
NT-PROBNP SERPL-SCNC: 6133 PG/ML — HIGH
PLATELET # BLD AUTO: 422 K/UL — HIGH (ref 150–400)
POTASSIUM SERPL-MCNC: 3.7 MMOL/L — SIGNIFICANT CHANGE UP (ref 3.5–5.3)
POTASSIUM SERPL-SCNC: 3.7 MMOL/L — SIGNIFICANT CHANGE UP (ref 3.5–5.3)
PROT SERPL-MCNC: 6.7 G/DL — SIGNIFICANT CHANGE UP (ref 6–8.3)
PROTHROM AB SERPL-ACNC: 12.4 SEC — SIGNIFICANT CHANGE UP (ref 10.5–13.4)
RBC # BLD: 3.43 M/UL — LOW (ref 3.8–5.2)
RBC # FLD: 14.2 % — SIGNIFICANT CHANGE UP (ref 10.3–14.5)
RH IG SCN BLD-IMP: POSITIVE — SIGNIFICANT CHANGE UP
SODIUM SERPL-SCNC: 135 MMOL/L — SIGNIFICANT CHANGE UP (ref 135–145)
URATE SERPL-MCNC: 8.1 MG/DL — HIGH (ref 2.5–7)
WBC # BLD: 7.31 K/UL — SIGNIFICANT CHANGE UP (ref 3.8–10.5)
WBC # FLD AUTO: 7.31 K/UL — SIGNIFICANT CHANGE UP (ref 3.8–10.5)

## 2022-05-19 PROCEDURE — 93306 TTE W/DOPPLER COMPLETE: CPT | Mod: 26

## 2022-05-19 PROCEDURE — 99221 1ST HOSP IP/OBS SF/LOW 40: CPT

## 2022-05-19 PROCEDURE — 71045 X-RAY EXAM CHEST 1 VIEW: CPT | Mod: 26

## 2022-05-19 PROCEDURE — 99223 1ST HOSP IP/OBS HIGH 75: CPT

## 2022-05-19 RX ORDER — CARVEDILOL PHOSPHATE 80 MG/1
1 CAPSULE, EXTENDED RELEASE ORAL
Qty: 60 | Refills: 0
Start: 2022-05-19 | End: 2022-06-17

## 2022-05-19 RX ORDER — DIPHENHYDRAMINE HCL 50 MG
25 CAPSULE ORAL ONCE
Refills: 0 | Status: COMPLETED | OUTPATIENT
Start: 2022-05-19 | End: 2022-05-20

## 2022-05-19 RX ORDER — CARVEDILOL PHOSPHATE 80 MG/1
3.12 CAPSULE, EXTENDED RELEASE ORAL EVERY 24 HOURS
Refills: 0 | Status: DISCONTINUED | OUTPATIENT
Start: 2022-05-19 | End: 2022-05-20

## 2022-05-19 RX ORDER — FUROSEMIDE 40 MG
20 TABLET ORAL DAILY
Refills: 0 | Status: DISCONTINUED | OUTPATIENT
Start: 2022-05-19 | End: 2022-05-21

## 2022-05-19 RX ORDER — NIFEDIPINE 30 MG
30 TABLET, EXTENDED RELEASE 24 HR ORAL DAILY
Refills: 0 | Status: DISCONTINUED | OUTPATIENT
Start: 2022-05-19 | End: 2022-05-19

## 2022-05-19 RX ADMIN — HEPARIN SODIUM 5000 UNIT(S): 5000 INJECTION INTRAVENOUS; SUBCUTANEOUS at 10:48

## 2022-05-19 RX ADMIN — LEVETIRACETAM 400 MILLIGRAM(S): 250 TABLET, FILM COATED ORAL at 22:14

## 2022-05-19 RX ADMIN — Medication 5 MILLIGRAM(S): at 18:41

## 2022-05-19 RX ADMIN — Medication 975 MILLIGRAM(S): at 12:48

## 2022-05-19 RX ADMIN — HEPARIN SODIUM 5000 UNIT(S): 5000 INJECTION INTRAVENOUS; SUBCUTANEOUS at 22:14

## 2022-05-19 RX ADMIN — CARVEDILOL PHOSPHATE 3.12 MILLIGRAM(S): 80 CAPSULE, EXTENDED RELEASE ORAL at 18:41

## 2022-05-19 RX ADMIN — Medication 20 MILLIGRAM(S): at 22:14

## 2022-05-19 RX ADMIN — Medication 975 MILLIGRAM(S): at 13:15

## 2022-05-19 RX ADMIN — Medication 200 MILLIGRAM(S): at 06:45

## 2022-05-19 RX ADMIN — LEVETIRACETAM 400 MILLIGRAM(S): 250 TABLET, FILM COATED ORAL at 10:47

## 2022-05-19 NOTE — PROGRESS NOTE ADULT - ASSESSMENT
25yo  PPD#5 s/p  c/b gHTN now admitted with postpartum severe PEC, r/o postpartum cardiomyopathy. Overnight, blood pressures improved with standing antihypertensives. Patient s/p Lasix 2/2 pulmonary edema, still requiring 4LNC however showing symptomatic improvement. Plan to TTE today to r/o postpartum cardiomyopathy    #sPEC  - continue Keppra 500BID x 24 hours  - BP control with Labetalol 200BID, Procardia 30xL  - s/p Procardia 10 IR, Hydral 10 IVP  - HELLP labs significant for mild transaminitis:   - f/u AM HELLP labs    #Shortness of breath  - DDx sPEC vs. postpartum cardiomyopathy   - CTPA (): Cardiomegaly and bilateral ground glass opacities with bilateral pleural effusions likely representing pulmonary edema -p  - CXR (): pulmonary vascular congestion - p  - s/p Lasix 20 IVP; strict I&O  - TTE pending  - ween O2 as tolerated; currently on 4L NC    #Tachycardia  - EKG: sinus tachycardia  - f/u CTA final read  - improving overnight  - continue to monitor    #Postpartum  - Reg Diet, SLIV  - HSQ and SCD for DVT PPx  - breast pump at bedside    SHANNON Bajwa, PGY-3

## 2022-05-19 NOTE — CONSULT NOTE ADULT - ASSESSMENT
26 year old Female P1  PP Day 4 with no known PMH besides gestational HTN which she was admitted for last evening. Patient presented to ED with c/o progressive BEATTY while walking up one flight of stairs and BLE swelling X 2days. She denies CP/SOB at rest/orthopnea and PND. No complaints of HA/dizziness or lightheadedness. No known family history of heart disease.      In the ED, found to have elevated BP (159/100) treated with labetalol 200mg Q8H; O2 Sat 93% treated with O2 4LNC and CXR with increased pulmonary vasculature treated with IV Lasix 20mg, she diuresed 5L. Echo revealed peripartum cardiomyopathy (EF 33%; LVIDd 5.4, mod-severe TR and mod PH).    Pertinent labs: Pro-BNP 6,133 and BUN/CR 6/0.46       26 year old Female P1  PP Day 4 with no known PMH besides gestational HTN which she was admitted for last evening. Patient presented to ED with c/o progressive BEATTY while walking up one flight of stairs and BLE swelling X 2days. She denies CP/SOB at rest/orthopnea and PND. No complaints of HA/dizziness or lightheadedness. No known family history of heart disease.      In the ED, found to have elevated BP (159/100) treated with labetalol 200mg Q8H; O2 Sat 93% treated with O2 4LNC and CXR with increased pulmonary vasculature treated with IV Lasix 20mg, she diuresed 5L. Echo revealed peripartum cardiomyopathy (EF 33%; LVIDd 5.4, mod-severe TR and mod PH).    Pertinent labs: Pro-BNP 6,133 and BUN/CR 6/0.46  RVP parainfluenza detected.

## 2022-05-19 NOTE — DISCHARGE NOTE PROVIDER - NSDCMRMEDTOKEN_GEN_ALL_CORE_FT
Coreg 3.125 mg oral tablet: 1 tab(s) orally 2 times a day   enalapril 5 mg oral tablet: 1 tab(s) orally once a day   Iron 100 Plus oral tablet: 1 tab(s) orally once a day  loratadine 10 mg oral tablet: 1 tab(s) orally once a day  Prenatal 1 oral capsule:    carvedilol 25 mg oral tablet: 1 tab(s) orally every 12 hours  electric blood pressure monitor : 3 times a day   enalapril 10 mg oral tablet: 1 tab(s) orally every 12 hours  furosemide 20 mg oral tablet: 1 tab(s) orally once a day  hydrALAZINE 10 mg oral tablet: 2 tab(s) orally every 8 hours  Iron 100 Plus oral tablet: 1 tab(s) orally once a day  loratadine 10 mg oral tablet: 1 tab(s) orally once a day  Prenatal 1 oral capsule:

## 2022-05-19 NOTE — PROGRESS NOTE ADULT - SUBJECTIVE AND OBJECTIVE BOX
OB Progress Note    S: Patient seen and examined at bedside. Patient reports improvement in symptoms overnight. Denies shortness of breath, chest pain, abdominal pain, headache, blurry vision, fevers, chills, nausea, vomiting.      O:   Vital Signs Last 24 Hrs  T(C): 36.7 (18 May 2022 23:44), Max: 36.8 (18 May 2022 21:08)  T(F): 98.1 (18 May 2022 23:44), Max: 98.2 (18 May 2022 21:08)  HR: 112 (19 May 2022 02:44) (88 - 130)  BP: 124/77 (19 May 2022 02:44) (113/68 - 159/100)  BP(mean): --  RR: 15 (19 May 2022 02:44) (15 - 17)  SpO2: 99% (19 May 2022 02:44) (93% - 99%)    Labs:                          10.4<L>   7.64 >-----------< 378    ( 05-18 @ 18:57 )             31.5<L>    05-18-22 @ 18:57      140  |  106  |  7   ----------------------------<  71  4.0   |  18<L>  |  0.56        Ca    9.5      18 May 2022 18:57    TPro  7.2  /  Alb  3.6  /  TBili  0.4  /  DBili  x   /  AST  34<H>  /  ALT  49<H>  /  AlkPhos  136<H>  05-18-22 @ 18:57          PE:  General: NAD  RR: on 4L NC, crackles in b/l lung bases  Abdomen: nontender nondistended  Extremities: Nontender, 1+  edema

## 2022-05-19 NOTE — DISCHARGE NOTE PROVIDER - CARE PROVIDERS DIRECT ADDRESSES
,DirectAddress_Unknown ,DirectAddress_Unknown,[email or web address]

## 2022-05-19 NOTE — DISCHARGE NOTE PROVIDER - NSDCCPCAREPLAN_GEN_ALL_CORE_FT
PRINCIPAL DISCHARGE DIAGNOSIS  Diagnosis: Preeclampsia in postpartum period  Assessment and Plan of Treatment:       SECONDARY DISCHARGE DIAGNOSES  Diagnosis: Cardiomyopathy  Assessment and Plan of Treatment:      PRINCIPAL DISCHARGE DIAGNOSIS  Diagnosis: Postpartum cardiomyopathy  Assessment and Plan of Treatment:       SECONDARY DISCHARGE DIAGNOSES  Diagnosis: Cardiomyopathy  Assessment and Plan of Treatment:     Diagnosis: Severe pre-eclampsia  Assessment and Plan of Treatment:      PRINCIPAL DISCHARGE DIAGNOSIS  Diagnosis: Postpartum cardiomyopathy  Assessment and Plan of Treatment: - Continue  meds as prescribed (hold is BP is under 110/60 or HR is under 60)  -Take blood pressure with at home cuff prior to taking medications; if BP is >150/90 call MD  - Return to hospital with headaches, visual changes, abdominal pain, nausea, vomiting, chest pain or shortness of breathe  - Follow up with OB and cardiology in 3 days as scheduled      SECONDARY DISCHARGE DIAGNOSES  Diagnosis: Cardiomyopathy  Assessment and Plan of Treatment:     Diagnosis: Severe pre-eclampsia  Assessment and Plan of Treatment:      PRINCIPAL DISCHARGE DIAGNOSIS  Diagnosis: Postpartum cardiomyopathy  Assessment and Plan of Treatment: - Continue  meds as prescribed (hold is BP is under 110/60 or HR is under 60)  -Take blood pressure with at home cuff prior to taking medications; if BP is >150/90 call MD  - Return to hospital with headaches, visual changes, abdominal pain, nausea, vomiting, chest pain or shortness of breathe  - Follow up with OB and cardiology in 3 days as scheduled      SECONDARY DISCHARGE DIAGNOSES  Diagnosis: Cardiomyopathy  Assessment and Plan of Treatment: Follow up outpatient for appointment on 5/31/2022 @ 1pm at Dr. Snowden's office for Jewel patch in the EP Clinic ( 4th floor Oncology building [facility] [id number], [place], [place], [address] [phone] )  Follow up in HF clinic w/ Dr. Cory Patterson on 5/26/22 at 10 am.    Diagnosis: Severe pre-eclampsia  Assessment and Plan of Treatment:

## 2022-05-19 NOTE — CONSULT NOTE ADULT - SUBJECTIVE AND OBJECTIVE BOX
Patient is a 26y old  Female who presents with a chief complaint of postpartum sPEC (19 May 2022 02:55)      HPI:    PAST MEDICAL & SURGICAL HISTORY:  No pertinent past medical history      No significant past surgical history          FAMILY HISTORY:  FH: type 2 diabetes (Mother)   due to kidney failure        Home Medications:  ibuprofen 600 mg oral tablet: 1 tab(s) orally every 6 hours (14 May 2022 07:57)  Iron 100 Plus oral tablet: 1 tab(s) orally once a day (12 May 2022 17:58)  loratadine 10 mg oral tablet: 1 tab(s) orally once a day (14 May 2022 07:55)  Prenatal 1 oral capsule:  (12 May 2022 17:58)      Medications:  heparin   Injectable 5000 Unit(s) SubCutaneous every 12 hours  labetalol 200 milliGRAM(s) Oral every 8 hours  levETIRAcetam  IVPB 500 milliGRAM(s) IV Intermittent every 12 hours      Review of systems:  10 point review of systems completed and are negative unless noted in HPI    Vitals:  T(C): 36.7 (22 @ 12:30), Max: 36.9 (22 @ 04:29)  HR: 120 (22 @ 14:30) (88 - 130)  BP: 131/86 (22 @ 14:30) (97/69 - 159/100)  BP(mean): 96 (22 @ 14:30) (75 - 104)  RR: 18 (22 @ 13:30) (15 - 18)  SpO2: 98% (22 @ 14:30) (93% - 99%)    Daily     Daily         I&O's Summary    18 May 2022 07:01  -  19 May 2022 07:00  --------------------------------------------------------  IN: 0 mL / OUT: 5350 mL / NET: -5350 mL    19 May 2022 07:01  -  19 May 2022 14:47  --------------------------------------------------------  IN: 2280 mL / OUT: 1950 mL / NET: 330 mL        Physical Exam:  Appearance: No Acute Distress  HEENT: PERRL  Neck:   Cardiovascular: Normal S1 S2, No murmurs/rubs/gallops  Respiratory: Clear to auscultation bilaterally  Gastrointestinal: Soft, Non-tender	  Skin: No cyanosis	  Neurologic: Non-focal  Extremities: No LE edema  Psychiatry: A & O x 3, Mood & affect appropriate    Labs:                        10.8   7.31  )-----------( 422      ( 19 May 2022 06:30 )             32.6         135  |  105  |  6<L>  ----------------------------<  78  3.7   |  14<L>  |  0.46<L>    Ca    9.4      19 May 2022 06:30    TPro  6.7  /  Alb  3.2<L>  /  TBili  0.4  /  DBili  x   /  AST  36<H>  /  ALT  48<H>  /  AlkPhos  125<H>      PT/INR - ( 19 May 2022 06:30 )   PT: 12.4 sec;   INR: 1.07 ratio         PTT - ( 19 May 2022 06:30 )  PTT:28.5 sec          TELEMETRY:    Echocardiogram:    EKG: Patient is a 26y old  Female who presents with a chief complaint of postpartum sPEC (19 May 2022 02:55)      HPI:  26 year old Female P1  PP Day 4 with no known PMH besides gestational HTN which she was admitted for last evening. Patient presented to ED with c/o progressive BEATTY while walking up one flight of stairs and BLE swelling X 2days. She denies CP/SOB at rest/orthopnea and PND. No complaints of HA/dizziness or lightheadedness. No known family history of heart disease.      In the ED, found to have elevated BP (159/100) treated with labetalol 200mg Q8H; O2 Sat 93% treated with O2 4LNC and CXR with increased pulmonary vasculature treated with IV Lasix 20mg, she diuresed 5L. Echo revealed peripartum cardiomyopathy (EF 33%; LVIDd 5.4, mod-severe TR and mod PH).    Pertinent labs: Pro-BNP 6,133 and BUN/CR 6/0.46      PAST MEDICAL & SURGICAL HISTORY:  No pertinent past medical history      No significant past surgical history          FAMILY HISTORY:  FH: type 2 diabetes (Mother)   due to kidney failure        Home Medications:  ibuprofen 600 mg oral tablet: 1 tab(s) orally every 6 hours (14 May 2022 07:57)  Iron 100 Plus oral tablet: 1 tab(s) orally once a day (12 May 2022 17:58)  loratadine 10 mg oral tablet: 1 tab(s) orally once a day (14 May 2022 07:55)  Prenatal 1 oral capsule:  (12 May 2022 17:58)      Medications:  heparin   Injectable 5000 Unit(s) SubCutaneous every 12 hours  labetalol 200 milliGRAM(s) Oral every 8 hours  levETIRAcetam  IVPB 500 milliGRAM(s) IV Intermittent every 12 hours      Review of systems:  10 point review of systems completed and are negative unless noted in HPI    Vitals:  T(C): 36.7 (22 @ 12:30), Max: 36.9 (22 @ 04:29)  HR: 120 (22 @ 14:30) (88 - 130)  BP: 131/86 (22 @ 14:30) (97/69 - 159/100)  BP(mean): 96 (22 @ 14:30) (75 - 104)  RR: 18 (22 @ 13:30) (15 - 18)  SpO2: 98% (22 @ 14:30) (93% - 99%)    Daily     Daily         I&O's Summary    18 May 2022 07:01  -  19 May 2022 07:00  --------------------------------------------------------  IN: 0 mL / OUT: 5350 mL / NET: -5350 mL    19 May 2022 07:01  -  19 May 2022 14:47  --------------------------------------------------------  IN: 2280 mL / OUT: 1950 mL / NET: 330 mL        Physical Exam:  Appearance: No Acute Distress  Neck: mild JVD   Cardiovascular: Normal S1 S2  Respiratory: Clear to auscultation bilaterally  Gastrointestinal: Soft, Non-tender	  Skin: No cyanosis	  Neurologic: Non-focal  Extremities: Trace/1+ BLE edema  Psychiatry: A & O x 3, Mood & affect appropriate    Labs:                        10.8   7.31  )-----------( 422      ( 19 May 2022 06:30 )             32.6         135  |  105  |  6<L>  ----------------------------<  78  3.7   |  14<L>  |  0.46<L>    Ca    9.4      19 May 2022 06:30    TPro  6.7  /  Alb  3.2<L>  /  TBili  0.4  /  DBili  x   /  AST  36<H>  /  ALT  48<H>  /  AlkPhos  125<H>      PT/INR - ( 19 May 2022 06:30 )   PT: 12.4 sec;   INR: 1.07 ratio         PTT - ( 19 May 2022 06:30 )  PTT:28.5 sec    Serum Pro-Brain Natriuretic Peptide: 6133: Acute congestive heart failure is unlikely if NT-proBNP is < 300 pg/mL.        TELEMETRY: Sinus Rhythm/Sinus Tachycardia (HR )    Echocardiogram:  Transthoracic Echocardiogram (22 @ 10:04)     DIMENSIONS:  Dimensions:     Normal Values:  LA:     2.5 cm    2.0 - 4.0 cm  Ao:     2.5 cm    2.0 - 3.8 cm  SEPTUM: 0.8 cm    0.6 - 1.2 cm  PWT:    0.8 cm    0.6 - 1.1 cm  LVIDd:  5.4 cm    3.0 - 5.6 cm  LVIDs:  3.9 cm    1.8 - 4.0 cm  Derived Variables:  LVMI: 87 g/m2  RWT: 0.29  Fractional short: 28 %  Ejection Fraction (Modified Mane Rule): 33 %  ------------------------------------------------------------------------  OBSERVATIONS:  Mitral Valve: Normal mitral valve. Moderate mitral  regurgitation.  Aortic Root: Normal aortic root.  Aortic Valve: Normal trileaflet aortic valve. No aortic  valve regurgitation seen.  Left Atrium: Normal left atrium.  LA volume index = 28  cc/m2.  Left Ventricle: Severe global left ventricular systolic  dysfunction. Normal left ventricular internal dimensions  and wall thicknesses. Increased E/e'  is consistent with  elevated left ventricular filling pressure.  Right Heart: Normal right atrium. Normal right ventricular  size with decreased right ventricular systolic function.  Normal tricuspid valve. Moderate-severe tricuspid  regurgitation. Normal pulmonic valve.  Pericardium/Pleura Normal pericardium with no pericardial  effusion.  Hemodynamic: Estimated right ventricular systolic pressure  equals 52 mm Hg, assuming right atrial pressure equals 8 mm  Hg, consistent with moderate pulmonary hypertension.  ------------------------------------------------------------------------  CONCLUSIONS:  1. Moderate mitral regurgitation.  2. Severe global left ventricular systolic dysfunction. EF  30-35%.  3. Increased E/e'  is consistent with elevated left  ventricular filling pressure.  4. Normal right ventricular size with decreased right  ventricular systolic function.  5. Normal tricuspid valve. Moderate-severe tricuspid  regurgitation.  6. Estimated pulmonary artery systolic pressure equals 52  mm Hg, assuming right atrial pressure equals 8  mm Hg,  consistent with moderate pulmonary hypertension.  **These findings are consistent with a peripartum  cardiomyopathy, in the appropriate clinical circumstances.  ADDENDUM 2022: Include LVEF in conclusions          EKG: Patient is a 26y old  Female who presents with a chief complaint of postpartum sPEC (19 May 2022 02:55)      HPI:  26 year old Female P1  PP Day 4 with no known PMH besides gestational HTN which she was admitted for last evening. Patient presented to ED with c/o progressive BEATTY while walking up one flight of stairs and BLE swelling X 2days. She denies CP/SOB at rest/orthopnea and PND. No complaints of HA/dizziness or lightheadedness. No known family history of heart disease.      In the ED, found to have elevated BP (159/100) treated with labetalol 200mg Q8H; O2 Sat 93% treated with O2 4LNC and CXR with increased pulmonary vasculature treated with IV Lasix 20mg, she diuresed 5L. Echo revealed peripartum cardiomyopathy (EF 33%; LVIDd 5.4, mod-severe TR and mod PH).    Pertinent labs: Pro-BNP 6,133 and BUN/CR 6/0.46    RVP parainfluenza detected.         PAST MEDICAL & SURGICAL HISTORY:  No pertinent past medical history      No significant past surgical history          FAMILY HISTORY:  FH: type 2 diabetes (Mother)   due to kidney failure        Home Medications:  ibuprofen 600 mg oral tablet: 1 tab(s) orally every 6 hours (14 May 2022 07:57)  Iron 100 Plus oral tablet: 1 tab(s) orally once a day (12 May 2022 17:58)  loratadine 10 mg oral tablet: 1 tab(s) orally once a day (14 May 2022 07:55)  Prenatal 1 oral capsule:  (12 May 2022 17:58)      Medications:  heparin   Injectable 5000 Unit(s) SubCutaneous every 12 hours  labetalol 200 milliGRAM(s) Oral every 8 hours  levETIRAcetam  IVPB 500 milliGRAM(s) IV Intermittent every 12 hours      Review of systems:  10 point review of systems completed and are negative unless noted in HPI    Vitals:  T(C): 36.7 (22 @ 12:30), Max: 36.9 (22 @ 04:29)  HR: 120 (22 @ 14:30) (88 - 130)  BP: 131/86 (22 @ 14:30) (97/69 - 159/100)  BP(mean): 96 (22 @ 14:30) (75 - 104)  RR: 18 (22 @ 13:30) (15 - 18)  SpO2: 98% (22 @ 14:30) (93% - 99%)    Daily     Daily         I&O's Summary    18 May 2022 07:01  -  19 May 2022 07:00  --------------------------------------------------------  IN: 0 mL / OUT: 5350 mL / NET: -5350 mL    19 May 2022 07:01  -  19 May 2022 14:47  --------------------------------------------------------  IN: 2280 mL / OUT: 1950 mL / NET: 330 mL        Physical Exam:  Appearance: No Acute Distress  Neck: mild JVD   Cardiovascular: Normal S1 S2  Respiratory: Clear to auscultation bilaterally  Gastrointestinal: Soft, Non-tender	  Skin: No cyanosis	  Neurologic: Non-focal  Extremities: Trace/1+ BLE edema  Psychiatry: A & O x 3, Mood & affect appropriate    Labs:                        10.8   7.31  )-----------( 422      ( 19 May 2022 06:30 )             32.6         135  |  105  |  6<L>  ----------------------------<  78  3.7   |  14<L>  |  0.46<L>    Ca    9.4      19 May 2022 06:30    TPro  6.7  /  Alb  3.2<L>  /  TBili  0.4  /  DBili  x   /  AST  36<H>  /  ALT  48<H>  /  AlkPhos  125<H>      PT/INR - ( 19 May 2022 06:30 )   PT: 12.4 sec;   INR: 1.07 ratio         PTT - ( 19 May 2022 06:30 )  PTT:28.5 sec    Serum Pro-Brain Natriuretic Peptide: 6133: Acute congestive heart failure is unlikely if NT-proBNP is < 300 pg/mL.    Respiratory Viral Panel with COVID-19 by KATINA (22 @ 19:45)    Rapid RVP Result: Detected    SARS-CoV-2: NotDetec: This Respiratory Panel uses polymerase chain reaction (PCR) to detect for  adenovirus; coronavirus (HKU1, NL63, 229E, OC43); human metapneumovirus  (hMPV); human enterovirus/rhinovirus (Entero/RV); influenza A; influenza  A/H1; influenza A/H3; influenza A/H1-2009; influenza B; parainfluenza  viruses 1, 2, 3, 4; respiratory syncytial virus; Mycoplasma pneumoniae;  Chlamydophila pneumoniae; and SARS-CoV-2.    Adenovirus (RapRVP): NotDetec    Influenza A (RapRVP): NotDetec    Influenza B (RapRVP): NotDetec    Parainfluenza 1 (RapRVP): NotDetec    Parainfluenza 2 (RapRVP): NotDetec    Parainfluenza 3 (RapRVP): Detected    Parainfluenza 4 (RapRVP): NotDetec    Resp Syncytial Virus (RapRVP): NotDetec    Bordetella pertussis (RapRVP): NotDetec    Bordetella parapertussis (RapRVP): NotDetec    Chlamydia pneumoniae (RapRVP): NotDetec    Mycoplasma pneumoniae (RapRVP): NotDetec    Entero/Rhinovirus (RapRVP): NotDetec    HKU1 Coronavirus (RapRVP): NotDetec    NL63 Coronavirus (RapRVP): NotDetec    229E Coronavirus (RapRVP): NotDetec    OC43 Coronavirus (RapRVP): NotDetec    hMPV (RapRVP): NotDetec        TELEMETRY: Sinus Rhythm/Sinus Tachycardia (HR )    Echocardiogram:  Transthoracic Echocardiogram (22 @ 10:04)     DIMENSIONS:  Dimensions:     Normal Values:  LA:     2.5 cm    2.0 - 4.0 cm  Ao:     2.5 cm    2.0 - 3.8 cm  SEPTUM: 0.8 cm    0.6 - 1.2 cm  PWT:    0.8 cm    0.6 - 1.1 cm  LVIDd:  5.4 cm    3.0 - 5.6 cm  LVIDs:  3.9 cm    1.8 - 4.0 cm  Derived Variables:  LVMI: 87 g/m2  RWT: 0.29  Fractional short: 28 %  Ejection Fraction (Modified Mane Rule): 33 %  ------------------------------------------------------------------------  OBSERVATIONS:  Mitral Valve: Normal mitral valve. Moderate mitral  regurgitation.  Aortic Root: Normal aortic root.  Aortic Valve: Normal trileaflet aortic valve. No aortic  valve regurgitation seen.  Left Atrium: Normal left atrium.  LA volume index = 28  cc/m2.  Left Ventricle: Severe global left ventricular systolic  dysfunction. Normal left ventricular internal dimensions  and wall thicknesses. Increased E/e'  is consistent with  elevated left ventricular filling pressure.  Right Heart: Normal right atrium. Normal right ventricular  size with decreased right ventricular systolic function.  Normal tricuspid valve. Moderate-severe tricuspid  regurgitation. Normal pulmonic valve.  Pericardium/Pleura Normal pericardium with no pericardial  effusion.  Hemodynamic: Estimated right ventricular systolic pressure  equals 52 mm Hg, assuming right atrial pressure equals 8 mm  Hg, consistent with moderate pulmonary hypertension.  ------------------------------------------------------------------------  CONCLUSIONS:  1. Moderate mitral regurgitation.  2. Severe global left ventricular systolic dysfunction. EF  30-35%.  3. Increased E/e'  is consistent with elevated left  ventricular filling pressure.  4. Normal right ventricular size with decreased right  ventricular systolic function.  5. Normal tricuspid valve. Moderate-severe tricuspid  regurgitation.  6. Estimated pulmonary artery systolic pressure equals 52  mm Hg, assuming right atrial pressure equals 8  mm Hg,  consistent with moderate pulmonary hypertension.  **These findings are consistent with a peripartum  cardiomyopathy, in the appropriate clinical circumstances.  ADDENDUM 2022: Include LVEF in conclusions          EKG:

## 2022-05-19 NOTE — DISCHARGE NOTE PROVIDER - NSDCCPGOAL_GEN_ALL_CORE_FT
To get better and follow your care plan as instructed.  - notify OBGYN for headaches, visual disturbances, nausea, vomiting, pain under right breast  -return to hospital for shortness of breath with activity/rest, bloating in abdomen, coughing while lying down, difficulty lying flat to sleep, hearbeats that feels rapid, pounding or fluttering, chest discomfort/pressure  -Please follow up with OBGYN by ______________________.

## 2022-05-19 NOTE — DISCHARGE NOTE PROVIDER - DISCHARGE DIET
-chronic, stable  -TSH WNL  -continue home levothyroxine    Regular Diet - No restrictions Low Sodium Diet

## 2022-05-19 NOTE — DISCHARGE NOTE PROVIDER - HOSPITAL COURSE
's patient is a 25yo  PPD ___ s/p  complicated by gHTN now admitted with postpartum severe PEC, r/o postpartum cardiomyopathy. Blood pressures improved with standing antihypertensives. Patient s/p Lasix 2/2 pulmonary edema, no longer requiring O2 and symptomatically improving. TTE concerning for postpartum cardiomyopathy.     Severe preeclampsia  - continue Keppra 500BID x 24 hours  - s/p Labetalol 200BID and Procardia 30xL  - s/p Procardia 10 IR, Hydral 10 IVP  Shortness of breath  - DDx sPEC vs. postpartum cardiomyopathy   - CTPA (): Cardiomegaly and bilateral ground glass opacities with bilateral   pleural effusions likely representing pulmonary edema   - CXR (): pulmonary vascular congestion   - s/p Lasix 20 IVP; strict I&O  - TTE (): consistent with peripartum cardiomyopathy EF 30-35%  - Tele monitoring, cards consult obtained  - Repeat CXR 2022: without significant change when compared to prior study 2022, concerning for persistent pulmonary edema, bilateral pleural effusions present.   2022 Cardiology recommendations:  Lasix 20 mg iv qd.  Start enalapril 5 mg po qd.  Start Coreg 3.125 mg po bid.    [facility] discharge recommendation:   Patient's follow up plan:   25yo  s/p  complicated by gHTN readmitted on PPD#4  with sPEC and postpartum cardiomyopathy.     Severe preeclampsia  - continue Keppra 500BID x 24 hours  - s/p Labetalol 200BID and Procardia 30xL  - s/p Procardia 10 IR, Hydral 10 IVP    Postpartum Cardiomyopathy  - CTPA (): No pulmonary embolism. Pulmonary edema and bilateral pleural effusions. Consolidative opacity within anterobasilar left lower lobe associated   with volume loss may represent superimposed pneumonia or atelectasis.  - CXR(): Indistinctness of pulmonary vasculature without significant change when  compared to prior study 2022, concerning for persistent pulmonary edema.  Bilateral pleural effusions.  - TTE (): Moderate mitral regurgitation. Severe global left ventricular systolic dysfunction. EF 30-35%. Increased E/e'  is consistent with elevated left ventricular filling pressure.Normal right ventricular size with decreased right ventricular systolic function.  Normal tricuspid valve. Moderate-severe tricuspid regurgitation. Estimated pulmonary artery systolic pressure equals 52 mm Hg, assuming right atrial pressure equals 8  mm Hg, consistent with moderate pulmonary hypertension  - Tele monitoring  - Heart failure team following and patient's regimen changed to: Lasix 20 mg iv qd, enalapril 5 mg po qd, Coreg 3.125 mg po bid.     27yo  s/p  complicated by gHTN readmitted on PPD#4  with sPEC and postpartum cardiomyopathy.   Severe preeclampsia  - s/p Keppra 500BID x 24 hours  - s/p Labetalol 200BID and Procardia 30xL  - s/p Procardia 10 IR, Hydral 10 IVP  Postpartum Cardiomyopathy  - CTPA (): No pulmonary embolism. Pulmonary edema and bilateral pleural effusions. Consolidative opacity within anterobasilar left lower lobe associated   with volume loss may represent superimposed pneumonia or atelectasis.  - CXR(): Indistinctness of pulmonary vasculature without significant change when  compared to prior study 2022, concerning for persistent pulmonary edema.  Bilateral pleural effusions.  - TTE (): Moderate mitral regurgitation. Severe global left ventricular systolic dysfunction. EF 30-35%. Increased E/e'  is consistent with elevated left ventricular filling pressure.Normal right ventricular size with decreased right ventricular systolic function.  Normal tricuspid valve. Moderate-severe tricuspid regurgitation. Estimated pulmonary artery systolic pressure equals 52 mm Hg, assuming right atrial pressure equals 8  mm Hg, consistent with moderate pulmonary hypertension  - s/p Tele monitoring  - Heart failure team following and patient's regimen changed to: Lasix 20 mg iv qd, enalapril 5 mg po qd, Coreg 3.125 mg po bid.-> Increased regimen to Lasix 20mg PO daily, enalapril 10mg PO BID, and Coreg 25 mg BID PO.     Patient asymptomatic, denies chest pain, headaches, shortness of breathe. BPs controlled on above medications. Patient stable for discharge with close outpatient follow up with Cardiology in 3 days on  and OB within one week.     Labs:                        11.1   7.15  )-----------( 497      ( 23 May 2022 05:57 )             32.7       05-    135  |  103  |  9   ----------------------------<  64<L>  4.0   |  15<L>  |  0.61    Ca    9.6      23 May 2022 05:57    TPro  6.7  /  Alb  3.5  /  TBili  0.3  /  DBili  x   /  AST  16  /  ALT  21  /  AlkPhos  108          PT/INR - ( 23 May 2022 05:57 )   PT: 13.0 sec;   INR: 1.12 ratio         PTT - ( 23 May 2022 05:57 )  PTT:31.6 sec 27yo  s/p  complicated by gHTN readmitted on PPD#4  with sPEC and postpartum cardiomyopathy.   Severe preeclampsia  - s/p Keppra 500BID x 24 hours  - s/p Labetalol 200BID and Procardia 30xL  - s/p Procardia 10 IR, Hydral 10 IVP  Postpartum Cardiomyopathy  - CTPA (): No pulmonary embolism. Pulmonary edema and bilateral pleural effusions. Consolidative opacity within anterobasilar left lower lobe associated   with volume loss may represent superimposed pneumonia or atelectasis.  - CXR(): Indistinctness of pulmonary vasculature without significant change when  compared to prior study 2022, concerning for persistent pulmonary edema.  Bilateral pleural effusions.  - TTE (): Moderate mitral regurgitation. Severe global left ventricular systolic dysfunction. EF 30-35%. Increased E/e'  is consistent with elevated left ventricular filling pressure.Normal right ventricular size with decreased right ventricular systolic function.  Normal tricuspid valve. Moderate-severe tricuspid regurgitation. Estimated pulmonary artery systolic pressure equals 52 mm Hg, assuming right atrial pressure equals 8  mm Hg, consistent with moderate pulmonary hypertension  - s/p Tele monitoring  - Heart failure team following and patient's regimen changed to: Lasix 20 mg iv qd, enalapril 5 mg po qd, Coreg 3.125 mg po bid.-> Increased regimen to Lasix 20mg PO daily, enalapril 10mg PO BID, and Coreg 25 mg BID PO. Hydralazine 20mg TID added on . BPs since remain WNL. EP consulted on Patient on : - Given new peripartum cardiomyopathy, life vest vs Jewel patch study discussed with patient,by Dr. Snowden; benefit and alternative discussed at length, all questions and concerns addressed. pt. voiced understanding and she opted for enrolling Jewel patch study. Appointment on 2022 @ 1pm at Dr. Snowden's office for Jewel patch in the EP Clinic ( 4th floor Oncology building [facility] [address], [place], [address] [phone] )     Patient asymptomatic, denies chest pain, headaches, shortness of breathe. BPs controlled on above medications. Patient stable for discharge with close outpatient follow up with Cardiology in 2 days on  and OB by end of the week. Patient also scheduled for Jewel patch application on .     Labs:                        11.1   7.15  )-----------( 497      ( 23 May 2022 05:57 )             32.7           135  |  103  |  9   ----------------------------<  64<L>  4.0   |  15<L>  |  0.61    Ca    9.6      23 May 2022 05:57    TPro  6.7  /  Alb  3.5  /  TBili  0.3  /  DBili  x   /  AST  16  /  ALT  21  /  AlkPhos  108          PT/INR - ( 23 May 2022 05:57 )   PT: 13.0 sec;   INR: 1.12 ratio         PTT - ( 23 May 2022 05:57 )  PTT:31.6 sec

## 2022-05-19 NOTE — DISCHARGE NOTE PROVIDER - NSDCFUADDAPPT_GEN_ALL_CORE_FT
- Continue medications as prescribed (hold if BP is under 110/60 or HR is under 60)  -Take blood pressure with at home cuff prior to taking medications; if BP is >150/90 call MD  - Return to hospital with headaches, visual changes, abdominal pain, nausea, vomiting, chest pain or shortness of breathe  - Follow up with OB and cardiology in 3 days as scheduled - Continue medications as prescribed (hold if BP is under 110/60 or HR is under 60)  -Take blood pressure with at home cuff prior to taking medications; if BP is >150/90 call MD  - Return to hospital with headaches, visual changes, abdominal pain, nausea, vomiting, chest pain or shortness of breathe  - Follow up with OB and cardiology in 3 days as scheduled  - keep track of weight, if weight is increasing >2 lbs a day, or if having SOB, worsening LE edema, could be a sign of fluid retention secondary to HF, office [phone].   Follow up in HF clinic w/ Dr. Cory Patterson on 5/26/22 at 10 am. - Continue medications as prescribed (hold if BP is under 110/60 or HR is under 60)  -Take blood pressure with at home cuff prior to taking medications; if BP is >150/90 call MD  - Return to hospital with headaches, visual changes, abdominal pain, nausea, vomiting, chest pain or shortness of breathe  - Follow up with OB and cardiology in 3 days as scheduled  - keep track of weight, if weight is increasing >2 lbs a day, or if having SOB, worsening LE edema, could be a sign of fluid retention secondary to HF, office [phone].   -Follow up in HF clinic w/ Dr. Cory Patterson on 5/26/22 at 10 am.  -Follow up outpatient for appointment on 5/31/2022 @ 1pm at Dr. Snowden's office for Jewel patch in the EP Clinic ( 4th floor Oncology building [facility] [address], [address], [place], [place], [address] [phone] )

## 2022-05-19 NOTE — DISCHARGE NOTE PROVIDER - CARE PROVIDER_API CALL
Luciano Melendez)  Obstetrics and Gynecology  [address] [place] [address]  Phone: [phone]  Fax: [phone]  Follow Up Time:    Luciano Melendez)  Obstetrics and Gynecology  [address] [place] [address]  Phone: [phone]  Fax: [phone]  Follow Up Time:     Marquise Snowden)  Cardiac Electrophysiology; Cardiovascular Disease; Internal Medicine  [address] [place] [address], [address]  [place] [address]  Phone: [phone]  Fax: [phone]  Follow Up Time:

## 2022-05-19 NOTE — DISCHARGE NOTE PROVIDER - PROVIDER TOKENS
PROVIDER:[TOKEN:[[id number]]] PROVIDER:[TOKEN:[[id number]]],PROVIDER:[TOKEN:[[id number]]]

## 2022-05-19 NOTE — DISCHARGE NOTE PROVIDER - NSDCFUSCHEDAPPT_GEN_ALL_CORE_FT
Jonathan Caraballo  [facility] Prenatal  Scheduled Appointment: 05/26/2022     Cory Patterson  [facility]  CARDIOLOGY [address]  Scheduled Appointment: 05/26/2022    Jonathan Caraballo  [facility] Prenatal  Scheduled Appointment: 05/26/2022

## 2022-05-19 NOTE — PROGRESS NOTE ADULT - ATTENDING COMMENTS
MFM add.  agree w above  briefly, 25yo  PPD#5 s/p  c/b gHTN now admitted with postpartum severe PEC, r/o postpartum cardiomyopathy. BP control improved with antihypertensives. respiratory status improved after Lasix/diuresis. working on weaning O2 NC, to maintain O2 Sat >96%. pt symptomatically improved  awaiting echo read, although less suspicion for cadiomyopathy at this time  repeat CXR today  continue Keppra 500BID x 24 hours  current BP  regimen - control with Labetalol 200BID, Procardia 30xL. will trial increased labetalol and d/c procardia. may help with mild tachycardia  Trend labs, follow ast/alt  Monitor Is/Os  HSQ and SCD for DVT PPx  breast pump at bedside    dylan pinedo MD

## 2022-05-20 LAB
ALBUMIN SERPL ELPH-MCNC: 3.3 G/DL — SIGNIFICANT CHANGE UP (ref 3.3–5)
ALBUMIN SERPL ELPH-MCNC: 3.5 G/DL — SIGNIFICANT CHANGE UP (ref 3.3–5)
ALP SERPL-CCNC: 123 U/L — HIGH (ref 40–120)
ALP SERPL-CCNC: 126 U/L — HIGH (ref 40–120)
ALT FLD-CCNC: 31 U/L — SIGNIFICANT CHANGE UP (ref 4–33)
ALT FLD-CCNC: 38 U/L — HIGH (ref 4–33)
ANION GAP SERPL CALC-SCNC: 14 MMOL/L — SIGNIFICANT CHANGE UP (ref 7–14)
ANION GAP SERPL CALC-SCNC: 16 MMOL/L — HIGH (ref 7–14)
APTT BLD: 29.4 SEC — SIGNIFICANT CHANGE UP (ref 27–36.3)
AST SERPL-CCNC: 18 U/L — SIGNIFICANT CHANGE UP (ref 4–32)
AST SERPL-CCNC: 21 U/L — SIGNIFICANT CHANGE UP (ref 4–32)
BASOPHILS # BLD AUTO: 0.02 K/UL — SIGNIFICANT CHANGE UP (ref 0–0.2)
BASOPHILS NFR BLD AUTO: 0.3 % — SIGNIFICANT CHANGE UP (ref 0–2)
BILIRUB SERPL-MCNC: 0.4 MG/DL — SIGNIFICANT CHANGE UP (ref 0.2–1.2)
BILIRUB SERPL-MCNC: 0.5 MG/DL — SIGNIFICANT CHANGE UP (ref 0.2–1.2)
BUN SERPL-MCNC: 5 MG/DL — LOW (ref 7–23)
BUN SERPL-MCNC: 6 MG/DL — LOW (ref 7–23)
CALCIUM SERPL-MCNC: 9.1 MG/DL — SIGNIFICANT CHANGE UP (ref 8.4–10.5)
CALCIUM SERPL-MCNC: 9.2 MG/DL — SIGNIFICANT CHANGE UP (ref 8.4–10.5)
CHLORIDE SERPL-SCNC: 105 MMOL/L — SIGNIFICANT CHANGE UP (ref 98–107)
CHLORIDE SERPL-SCNC: 106 MMOL/L — SIGNIFICANT CHANGE UP (ref 98–107)
CO2 SERPL-SCNC: 17 MMOL/L — LOW (ref 22–31)
CO2 SERPL-SCNC: 17 MMOL/L — LOW (ref 22–31)
CREAT SERPL-MCNC: 0.56 MG/DL — SIGNIFICANT CHANGE UP (ref 0.5–1.3)
CREAT SERPL-MCNC: 0.62 MG/DL — SIGNIFICANT CHANGE UP (ref 0.5–1.3)
EGFR: 124 ML/MIN/1.73M2 — SIGNIFICANT CHANGE UP
EGFR: 127 ML/MIN/1.73M2 — SIGNIFICANT CHANGE UP
EOSINOPHIL # BLD AUTO: 0.19 K/UL — SIGNIFICANT CHANGE UP (ref 0–0.5)
EOSINOPHIL NFR BLD AUTO: 2.9 % — SIGNIFICANT CHANGE UP (ref 0–6)
FIBRINOGEN PPP-MCNC: 512 MG/DL — SIGNIFICANT CHANGE UP (ref 330–520)
GLUCOSE SERPL-MCNC: 73 MG/DL — SIGNIFICANT CHANGE UP (ref 70–99)
GLUCOSE SERPL-MCNC: 85 MG/DL — SIGNIFICANT CHANGE UP (ref 70–99)
HCT VFR BLD CALC: 32.6 % — LOW (ref 34.5–45)
HCT VFR BLD CALC: 33 % — LOW (ref 34.5–45)
HGB BLD-MCNC: 11.3 G/DL — LOW (ref 11.5–15.5)
HGB BLD-MCNC: 11.3 G/DL — LOW (ref 11.5–15.5)
IANC: 3.52 K/UL — SIGNIFICANT CHANGE UP (ref 1.8–7.4)
IMM GRANULOCYTES NFR BLD AUTO: 0.8 % — SIGNIFICANT CHANGE UP (ref 0–1.5)
INR BLD: 1.07 RATIO — SIGNIFICANT CHANGE UP (ref 0.88–1.16)
LDH SERPL L TO P-CCNC: 307 U/L — HIGH (ref 135–225)
LYMPHOCYTES # BLD AUTO: 2.1 K/UL — SIGNIFICANT CHANGE UP (ref 1–3.3)
LYMPHOCYTES # BLD AUTO: 31.5 % — SIGNIFICANT CHANGE UP (ref 13–44)
MCHC RBC-ENTMCNC: 31.8 PG — SIGNIFICANT CHANGE UP (ref 27–34)
MCHC RBC-ENTMCNC: 31.8 PG — SIGNIFICANT CHANGE UP (ref 27–34)
MCHC RBC-ENTMCNC: 34.2 GM/DL — SIGNIFICANT CHANGE UP (ref 32–36)
MCHC RBC-ENTMCNC: 34.7 GM/DL — SIGNIFICANT CHANGE UP (ref 32–36)
MCV RBC AUTO: 91.8 FL — SIGNIFICANT CHANGE UP (ref 80–100)
MCV RBC AUTO: 93 FL — SIGNIFICANT CHANGE UP (ref 80–100)
MONOCYTES # BLD AUTO: 0.78 K/UL — SIGNIFICANT CHANGE UP (ref 0–0.9)
MONOCYTES NFR BLD AUTO: 11.7 % — SIGNIFICANT CHANGE UP (ref 2–14)
NEUTROPHILS # BLD AUTO: 3.52 K/UL — SIGNIFICANT CHANGE UP (ref 1.8–7.4)
NEUTROPHILS NFR BLD AUTO: 52.8 % — SIGNIFICANT CHANGE UP (ref 43–77)
NRBC # BLD: 0 /100 WBCS — SIGNIFICANT CHANGE UP
NRBC # BLD: 0 /100 WBCS — SIGNIFICANT CHANGE UP
NRBC # FLD: 0 K/UL — SIGNIFICANT CHANGE UP
NRBC # FLD: 0 K/UL — SIGNIFICANT CHANGE UP
PLATELET # BLD AUTO: 477 K/UL — HIGH (ref 150–400)
PLATELET # BLD AUTO: 545 K/UL — HIGH (ref 150–400)
POTASSIUM SERPL-MCNC: 3.1 MMOL/L — LOW (ref 3.5–5.3)
POTASSIUM SERPL-MCNC: 3.7 MMOL/L — SIGNIFICANT CHANGE UP (ref 3.5–5.3)
POTASSIUM SERPL-SCNC: 3.1 MMOL/L — LOW (ref 3.5–5.3)
POTASSIUM SERPL-SCNC: 3.7 MMOL/L — SIGNIFICANT CHANGE UP (ref 3.5–5.3)
PROT SERPL-MCNC: 7 G/DL — SIGNIFICANT CHANGE UP (ref 6–8.3)
PROT SERPL-MCNC: 7.1 G/DL — SIGNIFICANT CHANGE UP (ref 6–8.3)
PROTHROM AB SERPL-ACNC: 12.4 SEC — SIGNIFICANT CHANGE UP (ref 10.5–13.4)
RBC # BLD: 3.55 M/UL — LOW (ref 3.8–5.2)
RBC # BLD: 3.55 M/UL — LOW (ref 3.8–5.2)
RBC # FLD: 14.2 % — SIGNIFICANT CHANGE UP (ref 10.3–14.5)
RBC # FLD: 14.3 % — SIGNIFICANT CHANGE UP (ref 10.3–14.5)
SODIUM SERPL-SCNC: 137 MMOL/L — SIGNIFICANT CHANGE UP (ref 135–145)
SODIUM SERPL-SCNC: 138 MMOL/L — SIGNIFICANT CHANGE UP (ref 135–145)
URATE SERPL-MCNC: 8.4 MG/DL — HIGH (ref 2.5–7)
WBC # BLD: 6.66 K/UL — SIGNIFICANT CHANGE UP (ref 3.8–10.5)
WBC # BLD: 7.9 K/UL — SIGNIFICANT CHANGE UP (ref 3.8–10.5)
WBC # FLD AUTO: 6.66 K/UL — SIGNIFICANT CHANGE UP (ref 3.8–10.5)
WBC # FLD AUTO: 7.9 K/UL — SIGNIFICANT CHANGE UP (ref 3.8–10.5)

## 2022-05-20 PROCEDURE — 99232 SBSQ HOSP IP/OBS MODERATE 35: CPT

## 2022-05-20 RX ORDER — LABETALOL HCL 100 MG
20 TABLET ORAL ONCE
Refills: 0 | Status: COMPLETED | OUTPATIENT
Start: 2022-05-20 | End: 2022-05-20

## 2022-05-20 RX ORDER — POTASSIUM CHLORIDE 20 MEQ
10 PACKET (EA) ORAL
Refills: 0 | Status: COMPLETED | OUTPATIENT
Start: 2022-05-20 | End: 2022-05-20

## 2022-05-20 RX ORDER — CARVEDILOL PHOSPHATE 80 MG/1
6.25 CAPSULE, EXTENDED RELEASE ORAL EVERY 12 HOURS
Refills: 0 | Status: DISCONTINUED | OUTPATIENT
Start: 2022-05-20 | End: 2022-05-21

## 2022-05-20 RX ADMIN — Medication 100 MILLIEQUIVALENT(S): at 11:33

## 2022-05-20 RX ADMIN — Medication 100 MILLIEQUIVALENT(S): at 14:02

## 2022-05-20 RX ADMIN — Medication 20 MILLIGRAM(S): at 15:08

## 2022-05-20 RX ADMIN — Medication 5 MILLIGRAM(S): at 10:51

## 2022-05-20 RX ADMIN — HEPARIN SODIUM 5000 UNIT(S): 5000 INJECTION INTRAVENOUS; SUBCUTANEOUS at 23:22

## 2022-05-20 RX ADMIN — CARVEDILOL PHOSPHATE 6.25 MILLIGRAM(S): 80 CAPSULE, EXTENDED RELEASE ORAL at 22:38

## 2022-05-20 RX ADMIN — Medication 200 MILLIGRAM(S): at 20:13

## 2022-05-20 RX ADMIN — Medication 5 MILLIGRAM(S): at 22:38

## 2022-05-20 RX ADMIN — Medication 20 MILLIGRAM(S): at 23:21

## 2022-05-20 RX ADMIN — CARVEDILOL PHOSPHATE 6.25 MILLIGRAM(S): 80 CAPSULE, EXTENDED RELEASE ORAL at 10:51

## 2022-05-20 RX ADMIN — Medication 100 MILLIEQUIVALENT(S): at 09:37

## 2022-05-20 NOTE — PROGRESS NOTE ADULT - ASSESSMENT
26 year old Female P1  PP Day 4 with no known PMH besides gestational HTN which she was admitted for last evening. Patient presented to ED with c/o progressive BEATTY while walking up one flight of stairs and BLE swelling X 2days. She denies CP/SOB at rest/orthopnea and PND. No complaints of HA/dizziness or lightheadedness. No known family history of heart disease.      In the ED, found to have elevated BP (159/100) treated with labetalol 200mg Q8H; O2 Sat 93% treated with O2 4LNC and CXR with increased pulmonary vasculature treated with IV Lasix 20mg, she diuresed 5L. Echo revealed peripartum cardiomyopathy (EF 33%; LVIDd 5.4, mod-severe TR and mod PH).    Pertinent labs: Pro-BNP 6,133 and BUN/CR 6/0.46  RVP parainfluenza detected.     IV Lasix 20mg daily  Coreg 6.25mg Q12H  Enalapril 5mg Q12H  Strict I/O  Daily standing weights  Monitor lytes replete K>4.0 and Mg>2.0  Repeat BNP in AM  Management per primary team  HF counseling provided  Follow up appointment with Dr. Patterson on       26 year old Female P1  PP Day 4 with no known PMH besides gestational HTN which she was admitted for last evening. Patient presented to ED with c/o progressive BEATTY while walking up one flight of stairs and BLE swelling X 2days. She denies CP/SOB at rest/orthopnea and PND. No complaints of HA/dizziness or lightheadedness. No known family history of heart disease.      In the ED, found to have elevated BP (159/100) treated with labetalol 200mg Q8H; O2 Sat 93% treated with O2 4LNC and CXR with increased pulmonary vasculature treated with IV Lasix 20mg, she diuresed 5L. Echo revealed peripartum cardiomyopathy (EF 33%; LVIDd 5.4, mod-severe TR and mod PH).    Pertinent labs: Pro-BNP 6,133 and BUN/CR 6/0.46  RVP parainfluenza detected.     IV Lasix 20mg daily  Coreg 6.25mg Q12H  Enalapril 5mg Q12H  Strict I/O  Daily standing weights  Monitor lytes replete K>4.0 and Mg>2.0  Repeat BNP in AM  Management per primary team  HF counseling provided  Follow up appointment with Dr. Patterson on  5/26 at 10am

## 2022-05-20 NOTE — PROGRESS NOTE ADULT - ASSESSMENT
27yo  PPD#6 s/p  c/b gHTN now admitted with postpartum cardiomyopathy/sPEC. Pt feeling symptomatically improved at this time, on Lasix daily. BP management by cardiology, with no tele events overnight. Pt s/p 24hr seizure ppx with Keppra.     #sPEC  - s/p Keppra 500BID x 24 hours  - BP control Enalapril 5mg QD, Coreg 3.125 QD, mild range overnight  - To be titrated by HF team     #Cardiomyopathy  - Appreciate HF team consult   - Continue tele monitoring for arrhythmias  - Daily Lasix 20 IVP  - Strict I/Os  - Off O2 support at this time  - CTPA (): Cardiomegaly and bilateral ground glass opacities with bilateral pleural effusions likely representing pulmonary edema   - CXR (): pulmonary vascular congestion - p  - TTE (): consistent with peripartum cardiomyopathy EF 30-35%    #Postpartum  - Reg Diet, SLIV  - HSQ and SCD for DVT PPx  - Pump at bedside    #Contraception  - Pt counseled extensively on risk of subsequent pregnancy with current diagnosis  - Pt to discuss contraception with private attending    Tommie PGY3.  s

## 2022-05-20 NOTE — PROGRESS NOTE ADULT - SUBJECTIVE AND OBJECTIVE BOX
Interval History:  Patient resting comfortably in bed  She reports no further difficulty breathing today  Denies CP/SOB/palpitations/dizziness  No acute events overnight      Medications:  carvedilol 6.25 milliGRAM(s) Oral every 12 hours  enalapril 5 milliGRAM(s) Oral every 12 hours  furosemide   Injectable 20 milliGRAM(s) IV Push daily  heparin   Injectable 5000 Unit(s) SubCutaneous every 12 hours  potassium chloride  10 mEq/100 mL IVPB 10 milliEquivalent(s) IV Intermittent every 1 hour      Vitals:  T(C): 36.4 (05-20-22 @ 09:44), Max: 37 (05-19-22 @ 22:25)  HR: 108 (05-20-22 @ 11:56) (97 - 124)  BP: 142/102 (05-20-22 @ 11:56) (112/92 - 147/114)  BP(mean): 80 (05-19-22 @ 16:30) (80 - 97)  RR: 21 (05-20-22 @ 11:56) (16 - 21)  SpO2: 98% (05-20-22 @ 11:56) (93% - 98%)    Daily     Daily         I&O's Summary    19 May 2022 07:01  -  20 May 2022 07:00  --------------------------------------------------------  IN: 2960 mL / OUT: 6850 mL / NET: -3890 mL    20 May 2022 07:01  -  20 May 2022 12:42  --------------------------------------------------------  IN: 0 mL / OUT: 500 mL / NET: -500 mL        Physical Exam:  Appearance: No Acute Distress  Neck: JVD  Cardiovascular: Normal S1 S2  Respiratory: Clear to auscultation with fine bibasilar rales   Gastrointestinal: Soft, Non-tender	  Skin: No cyanosis	  Neurologic: Non-focal  Extremities: Trace BLE edema  Psychiatry: A & O x 3, Mood & affect appropriate    Labs:                        11.3   6.66  )-----------( 477      ( 20 May 2022 05:50 )             33.0     05-20    138  |  105  |  5<L>  ----------------------------<  73  3.1<L>   |  17<L>  |  0.56    Ca    9.2      20 May 2022 05:50    TPro  7.0  /  Alb  3.3  /  TBili  0.5  /  DBili  x   /  AST  21  /  ALT  38<H>  /  AlkPhos  123<H>  05-20    PT/INR - ( 20 May 2022 05:50 )   PT: 12.4 sec;   INR: 1.07 ratio         PTT - ( 20 May 2022 05:50 )  PTT:29.4 sec    TELEMETRY: Sinus Rhythm/Sinus Tachycardia       Echocardiogram:  Transthoracic Echocardiogram (05.19.22 @ 10:04)   ------------------------------------------------------------------------  DIMENSIONS:  Dimensions:     Normal Values:  LA:     2.5 cm    2.0 - 4.0 cm  Ao:     2.5 cm    2.0 - 3.8 cm  SEPTUM: 0.8 cm    0.6 - 1.2 cm  PWT:    0.8 cm    0.6 - 1.1 cm  LVIDd:  5.4 cm    3.0 - 5.6 cm  LVIDs:  3.9 cm    1.8 - 4.0 cm  Derived Variables:  LVMI: 87 g/m2  RWT: 0.29  Fractional short: 28 %  Ejection Fraction (Modified Mane Rule): 33 %  ------------------------------------------------------------------------  OBSERVATIONS:  Mitral Valve: Normal mitral valve. Moderate mitral  regurgitation.  Aortic Root: Normal aortic root.  Aortic Valve: Normal trileaflet aortic valve. No aortic  valve regurgitation seen.  Left Atrium: Normal left atrium.  LA volume index = 28  cc/m2.  Left Ventricle: Severe global left ventricular systolic  dysfunction. Normal left ventricular internal dimensions  and wall thicknesses. Increased E/e'  is consistent with  elevated left ventricularfilling pressure.  Right Heart: Normal right atrium. Normal right ventricular  size with decreased right ventricular systolic function.  Normal tricuspid valve. Moderate-severe tricuspid  regurgitation. Normal pulmonic valve.  Pericardium/PleuraNormal pericardium with no pericardial  effusion.  Hemodynamic: Estimated right ventricular systolic pressure  equals 52 mm Hg, assuming right atrial pressure equals 8 mm  Hg, consistent with moderate pulmonary hypertension.  ------------------------------------------------------------------------  CONCLUSIONS:  1. Moderate mitral regurgitation.  2. Severe global left ventricular systolic dysfunction. EF  30-35%.  3. Increased E/e'  is consistent with elevated left  ventricular filling pressure.  4. Normal right ventricular size with decreased right  ventricular systolic function.  5. Normal tricuspid valve. Moderate-severe tricuspid  regurgitation.  6. Estimated pulmonary artery systolic pressure equals 52  mm Hg, assuming right atrial pressure equals 8  mm Hg,  consistent with moderate pulmonary hypertension.  **These findings are consistent with a peripartum  cardiomyopathy, in the appropriate clinical circumstances.  ADDENDUM 5/19/2022: Include LVEF in conclusions

## 2022-05-20 NOTE — PROGRESS NOTE ADULT - NS ATTEND AMEND GEN_ALL_CORE FT
Increase enalapril to 10 mg po bid.  If SBP remains >130, increase Coreg to 12.5 mg po bid.  Change Lasix to 20 mg po qd.

## 2022-05-20 NOTE — PROGRESS NOTE ADULT - SUBJECTIVE AND OBJECTIVE BOX
OB Progress Note    S: Pt seen at bedside this morning. Patient feels well. Pain is well controlled.   Pt denies SOB, states she feels much improved. Notes high urine outpt overnight.   She is tolerating a regular diet and passing flatus. She is voiding spontaneously, and ambulating without difficulty. Denies lightheadedness/dizziness. Denies N/V.    No tele events overnight. PVCx2.     BPs noted to be mild range overnight.     Off of O2.     O:  Vitals:   Vital Signs Last 24 Hrs  T(C): 36.3 (20 May 2022 06:06), Max: 37 (19 May 2022 22:25)  T(F): 97.3 (20 May 2022 06:06), Max: 98.6 (19 May 2022 22:25)  HR: 111 (20 May 2022 06:06) (97 - 120)  BP: 132/105 (20 May 2022 06:06) (97/69 - 146/110)  BP(mean): 80 (19 May 2022 16:30) (75 - 104)  RR: 17 (20 May 2022 06:06) (16 - 18)  SpO2: 98% (20 May 2022 06:06) (93% - 99%)    MEDICATIONS  (STANDING):  carvedilol 3.125 milliGRAM(s) Oral every 24 hours  enalapril 5 milliGRAM(s) Oral daily  furosemide   Injectable 20 milliGRAM(s) IV Push daily  heparin   Injectable 5000 Unit(s) SubCutaneous every 12 hours    MEDICATIONS  (PRN):      Labs:  Blood type: O Positive  Rubella IgG: RPR: Negative                          11.3<L>   6.66 >-----------< 477<H>    ( 05-20 @ 05:50 )             33.0<L>                        10.8<L>   7.31 >-----------< 422<H>    ( 05-19 @ 06:30 )             32.6<L>                        10.4<L>   7.64 >-----------< 378    ( 05-18 @ 18:57 )             31.5<L>    05-20-22 @ 05:50      138  |  105  |  5<L>  ----------------------------<  73  3.1<L>   |  17<L>  |  0.56    05-19-22 @ 06:30      135  |  105  |  6<L>  ----------------------------<  78  3.7   |  14<L>  |  0.46<L>    05-18-22 @ 18:57      140  |  106  |  7   ----------------------------<  71  4.0   |  18<L>  |  0.56        Ca    9.2      20 May 2022 05:50  Ca    9.4      19 May 2022 06:30  Ca    9.5      18 May 2022 18:57    TPro  7.0  /  Alb  3.3  /  TBili  0.5  /  DBili  x   /  AST  21  /  ALT  38<H>  /  AlkPhos  123<H>  05-20-22 @ 05:50  TPro  6.7  /  Alb  3.2<L>  /  TBili  0.4  /  DBili  x   /  AST  36<H>  /  ALT  48<H>  /  AlkPhos  125<H>  05-19-22 @ 06:30  TPro  7.2  /  Alb  3.6  /  TBili  0.4  /  DBili  x   /  AST  34<H>  /  ALT  49<H>  /  AlkPhos  136<H>  05-18-22 @ 18:57          Physical Exam:  General: NAD  Abdomen: soft, non-tender, non-distended, fundus firm  Vaginal: Lochia wnl  Extremities: No erythema/edema

## 2022-05-21 LAB
ALBUMIN SERPL ELPH-MCNC: 3.3 G/DL — SIGNIFICANT CHANGE UP (ref 3.3–5)
ALP SERPL-CCNC: 120 U/L — SIGNIFICANT CHANGE UP (ref 40–120)
ALT FLD-CCNC: 26 U/L — SIGNIFICANT CHANGE UP (ref 4–33)
ANION GAP SERPL CALC-SCNC: 17 MMOL/L — HIGH (ref 7–14)
AST SERPL-CCNC: 15 U/L — SIGNIFICANT CHANGE UP (ref 4–32)
BASOPHILS # BLD AUTO: 0.05 K/UL — SIGNIFICANT CHANGE UP (ref 0–0.2)
BASOPHILS NFR BLD AUTO: 0.6 % — SIGNIFICANT CHANGE UP (ref 0–2)
BILIRUB SERPL-MCNC: 0.3 MG/DL — SIGNIFICANT CHANGE UP (ref 0.2–1.2)
BUN SERPL-MCNC: 7 MG/DL — SIGNIFICANT CHANGE UP (ref 7–23)
CALCIUM SERPL-MCNC: 9.3 MG/DL — SIGNIFICANT CHANGE UP (ref 8.4–10.5)
CHLORIDE SERPL-SCNC: 105 MMOL/L — SIGNIFICANT CHANGE UP (ref 98–107)
CO2 SERPL-SCNC: 17 MMOL/L — LOW (ref 22–31)
CREAT SERPL-MCNC: 0.61 MG/DL — SIGNIFICANT CHANGE UP (ref 0.5–1.3)
EGFR: 125 ML/MIN/1.73M2 — SIGNIFICANT CHANGE UP
EOSINOPHIL # BLD AUTO: 0.15 K/UL — SIGNIFICANT CHANGE UP (ref 0–0.5)
EOSINOPHIL NFR BLD AUTO: 1.9 % — SIGNIFICANT CHANGE UP (ref 0–6)
GLUCOSE SERPL-MCNC: 74 MG/DL — SIGNIFICANT CHANGE UP (ref 70–99)
HCT VFR BLD CALC: 33.6 % — LOW (ref 34.5–45)
HGB BLD-MCNC: 11.5 G/DL — SIGNIFICANT CHANGE UP (ref 11.5–15.5)
IANC: 3.86 K/UL — SIGNIFICANT CHANGE UP (ref 1.8–7.4)
IMM GRANULOCYTES NFR BLD AUTO: 0.6 % — SIGNIFICANT CHANGE UP (ref 0–1.5)
LYMPHOCYTES # BLD AUTO: 2.82 K/UL — SIGNIFICANT CHANGE UP (ref 1–3.3)
LYMPHOCYTES # BLD AUTO: 36.3 % — SIGNIFICANT CHANGE UP (ref 13–44)
MCHC RBC-ENTMCNC: 32.2 PG — SIGNIFICANT CHANGE UP (ref 27–34)
MCHC RBC-ENTMCNC: 34.2 GM/DL — SIGNIFICANT CHANGE UP (ref 32–36)
MCV RBC AUTO: 94.1 FL — SIGNIFICANT CHANGE UP (ref 80–100)
MONOCYTES # BLD AUTO: 0.83 K/UL — SIGNIFICANT CHANGE UP (ref 0–0.9)
MONOCYTES NFR BLD AUTO: 10.7 % — SIGNIFICANT CHANGE UP (ref 2–14)
NEUTROPHILS # BLD AUTO: 3.86 K/UL — SIGNIFICANT CHANGE UP (ref 1.8–7.4)
NEUTROPHILS NFR BLD AUTO: 49.9 % — SIGNIFICANT CHANGE UP (ref 43–77)
NRBC # BLD: 0 /100 WBCS — SIGNIFICANT CHANGE UP
NRBC # FLD: 0 K/UL — SIGNIFICANT CHANGE UP
NT-PROBNP SERPL-SCNC: 6729 PG/ML — HIGH
PLATELET # BLD AUTO: 539 K/UL — HIGH (ref 150–400)
POTASSIUM SERPL-MCNC: 3.4 MMOL/L — LOW (ref 3.5–5.3)
POTASSIUM SERPL-SCNC: 3.4 MMOL/L — LOW (ref 3.5–5.3)
PROT SERPL-MCNC: 6.8 G/DL — SIGNIFICANT CHANGE UP (ref 6–8.3)
RBC # BLD: 3.57 M/UL — LOW (ref 3.8–5.2)
RBC # FLD: 14.1 % — SIGNIFICANT CHANGE UP (ref 10.3–14.5)
SODIUM SERPL-SCNC: 139 MMOL/L — SIGNIFICANT CHANGE UP (ref 135–145)
WBC # BLD: 7.76 K/UL — SIGNIFICANT CHANGE UP (ref 3.8–10.5)
WBC # FLD AUTO: 7.76 K/UL — SIGNIFICANT CHANGE UP (ref 3.8–10.5)

## 2022-05-21 PROCEDURE — 99233 SBSQ HOSP IP/OBS HIGH 50: CPT

## 2022-05-21 PROCEDURE — 99233 SBSQ HOSP IP/OBS HIGH 50: CPT | Mod: GC

## 2022-05-21 RX ORDER — LABETALOL HCL 100 MG
20 TABLET ORAL ONCE
Refills: 0 | Status: COMPLETED | OUTPATIENT
Start: 2022-05-21 | End: 2022-05-21

## 2022-05-21 RX ORDER — POTASSIUM CHLORIDE 20 MEQ
10 PACKET (EA) ORAL
Refills: 0 | Status: DISCONTINUED | OUTPATIENT
Start: 2022-05-21 | End: 2022-05-21

## 2022-05-21 RX ORDER — LABETALOL HCL 100 MG
40 TABLET ORAL ONCE
Refills: 0 | Status: COMPLETED | OUTPATIENT
Start: 2022-05-21 | End: 2022-05-21

## 2022-05-21 RX ORDER — POTASSIUM CHLORIDE 20 MEQ
40 PACKET (EA) ORAL EVERY 4 HOURS
Refills: 0 | Status: COMPLETED | OUTPATIENT
Start: 2022-05-21 | End: 2022-05-21

## 2022-05-21 RX ORDER — CARVEDILOL PHOSPHATE 80 MG/1
12.5 CAPSULE, EXTENDED RELEASE ORAL EVERY 12 HOURS
Refills: 0 | Status: DISCONTINUED | OUTPATIENT
Start: 2022-05-21 | End: 2022-05-22

## 2022-05-21 RX ORDER — FUROSEMIDE 40 MG
20 TABLET ORAL DAILY
Refills: 0 | Status: DISCONTINUED | OUTPATIENT
Start: 2022-05-21 | End: 2022-05-24

## 2022-05-21 RX ADMIN — HEPARIN SODIUM 5000 UNIT(S): 5000 INJECTION INTRAVENOUS; SUBCUTANEOUS at 22:01

## 2022-05-21 RX ADMIN — CARVEDILOL PHOSPHATE 12.5 MILLIGRAM(S): 80 CAPSULE, EXTENDED RELEASE ORAL at 22:01

## 2022-05-21 RX ADMIN — Medication 20 MILLIGRAM(S): at 22:01

## 2022-05-21 RX ADMIN — HEPARIN SODIUM 5000 UNIT(S): 5000 INJECTION INTRAVENOUS; SUBCUTANEOUS at 10:31

## 2022-05-21 RX ADMIN — Medication 200 MILLIGRAM(S): at 22:08

## 2022-05-21 RX ADMIN — Medication 40 MILLIEQUIVALENT(S): at 13:17

## 2022-05-21 RX ADMIN — Medication 40 MILLIEQUIVALENT(S): at 18:08

## 2022-05-21 RX ADMIN — Medication 10 MILLIGRAM(S): at 22:01

## 2022-05-21 RX ADMIN — Medication 20 MILLIGRAM(S): at 14:35

## 2022-05-21 RX ADMIN — Medication 40 MILLIGRAM(S): at 15:00

## 2022-05-21 RX ADMIN — CARVEDILOL PHOSPHATE 12.5 MILLIGRAM(S): 80 CAPSULE, EXTENDED RELEASE ORAL at 10:31

## 2022-05-21 RX ADMIN — Medication 10 MILLIGRAM(S): at 10:31

## 2022-05-21 NOTE — PROGRESS NOTE ADULT - ATTENDING COMMENTS
Agree with above assessment and plan  Patient on Coreg, Enalapril and Lasix  Continue present meds  Followup with Dr. Patterson next week

## 2022-05-21 NOTE — PROGRESS NOTE ADULT - ASSESSMENT
25yo  PPD#7 s/p  c/b gHTN now admitted with postpartum cardiomyopathy/sPEC. Pt feeling symptomatically improved at this time, on Lasix daily. BP management by cardiology, with no tele events overnight. Pt s/p 24hr seizure ppx with Keppra.     #sPEC  - s/p Keppra 500BID x 24 hours  - BP control Enalapril 5mg BID, Coreg 6.5 BID, mild range overnight  - To be titrated by HF team     #Cardiomyopathy  - Appreciate HF team consult   - Continue tele monitoring for arrhythmias  - Daily Lasix 20 IVP  - AM CBC/CMP/BNP  - Replete Electrolytes as needed  - Strict I/Os  - Off O2 support at this time  - CTPA (): Cardiomegaly and bilateral ground glass opacities with bilateral pleural effusions likely representing pulmonary edema   - CXR (): pulmonary vascular congestion - p  - TTE (): consistent with peripartum cardiomyopathy EF 30-35%    #Postpartum  - Reg Diet, SLIV  - HSQ and SCD for DVT PPx  - Pump at bedside    #Contraception  - Pt counseled extensively on risk of subsequent pregnancy with current diagnosis  - Pt to discuss contraception with private attending      SHANNON Bajwa, PGY-3

## 2022-05-21 NOTE — PROGRESS NOTE ADULT - ATTENDING COMMENTS
patient seen and evaluated at bedside  per cardio recs yesterday at 4 pm, patient's medications adjusted  changed lasix to 20 mg PO daily (pt receives @ 10 pm)  increased enalapril to 10 mg BID   increased coreg to 12.5 mg BID due to systolics persistently >130 and diastolics > 90  medication adjustments reviewed with patient  patient denies any SOB, chest pain, palpitations, she reports she is feeling well with exception of mild manageable dry cough likely 2/2 parainfluenza virus +  replete K per cards recs, this AM 3.4, goal > 4, PO repletion ordered  all questions answered, followup cards/HF team recs today  discharge planning if goal BP met patient seen and evaluated at bedside  per cardio recs yesterday at 4 pm, patient's medications adjusted  changed lasix to 20 mg PO daily (pt receives @ 10 pm)  increased enalapril to 10 mg BID   increased coreg to 12.5 mg BID due to systolics persistently >130 and diastolics > 90  medication adjustments reviewed with patient  patient denies any SOB, chest pain, palpitations, she reports she is feeling well with exception of mild manageable dry cough likely 2/2 parainfluenza virus +  replete K per cards recs, this AM 3.4, goal > 4, PO repletion ordered  all questions answered, followup cards/HF team recs today  discharge planning if goal BP met    MFM Attending note.  Pt seen and examined at the bedside.  BP remains elevated.  Medication changes that were recommended by Cardiology were implemented.  Recommendation is to continue with hospitalization and medication optimization prior to discharge.    I have personally seen the patient and examined and fully participated in the care and agree with the above documentation of the assessment and plan by the Resident.    MARY Ralph MD  [facility]

## 2022-05-21 NOTE — PROGRESS NOTE ADULT - SUBJECTIVE AND OBJECTIVE BOX
OB Progress Note    S: Patient seen and examined at bedside. Reports improvement in shortness of breath. Denies N/V, CP/SOB/lightheadedness/dizziness, headache, visual disturbances, epigastric pain.     O:   Vital Signs Last 24 Hrs  T(C): 36.5 (21 May 2022 01:56), Max: 36.9 (20 May 2022 14:27)  T(F): 97.7 (21 May 2022 01:56), Max: 98.4 (20 May 2022 14:27)  HR: 105 (21 May 2022 01:56) (95 - 124)  BP: 139/97 (21 May 2022 01:56) (132/105 - 147/114)  BP(mean): --  RR: 18 (21 May 2022 01:56) (17 - 24)  SpO2: 95% (21 May 2022 01:56) (95% - 98%)    Labs:  Blood type: O Positive  Rubella IgG: RPR: Negative                          11.3<L>   7.90 >-----------< 545<H>    ( 05-20 @ 16:34 )             32.6<L>                        11.3<L>   6.66 >-----------< 477<H>    ( 05-20 @ 05:50 )             33.0<L>                        10.8<L>   7.31 >-----------< 422<H>    ( 05-19 @ 06:30 )             32.6<L>                        10.4<L>   7.64 >-----------< 378    ( 05-18 @ 18:57 )             31.5<L>    05-20-22 @ 16:34      137  |  106  |  6<L>  ----------------------------<  85  3.7   |  17<L>  |  0.62    05-20-22 @ 05:50      138  |  105  |  5<L>  ----------------------------<  73  3.1<L>   |  17<L>  |  0.56    05-19-22 @ 06:30      135  |  105  |  6<L>  ----------------------------<  78  3.7   |  14<L>  |  0.46<L>    05-18-22 @ 18:57      140  |  106  |  7   ----------------------------<  71  4.0   |  18<L>  |  0.56        Ca    9.1      20 May 2022 16:34  Ca    9.2      20 May 2022 05:50  Ca    9.4      19 May 2022 06:30  Ca    9.5      18 May 2022 18:57    TPro  7.1  /  Alb  3.5  /  TBili  0.4  /  DBili  x   /  AST  18  /  ALT  31  /  AlkPhos  126<H>  05-20-22 @ 16:34  TPro  7.0  /  Alb  3.3  /  TBili  0.5  /  DBili  x   /  AST  21  /  ALT  38<H>  /  AlkPhos  123<H>  05-20-22 @ 05:50  TPro  6.7  /  Alb  3.2<L>  /  TBili  0.4  /  DBili  x   /  AST  36<H>  /  ALT  48<H>  /  AlkPhos  125<H>  05-19-22 @ 06:30  TPro  7.2  /  Alb  3.6  /  TBili  0.4  /  DBili  x   /  AST  34<H>  /  ALT  49<H>  /  AlkPhos  136<H>  05-18-22 @ 18:57          PE:  General: NAD  Abdomen: nontender nondistended  Extremities: Nontender, mild b/l edema

## 2022-05-21 NOTE — PROGRESS NOTE ADULT - ASSESSMENT
26 year old Female with gestational HTN who presented post partum with decompensated heart failure, TTE with evidence of peripartum cardiomyopathy (EF 33%; LVIDd 5.4, mod-severe TR and mod PH).    #ADHF 2/2 peripartum cardiomyopathy  #HTN  - Euvolemic on exam  - Continue carvedilol 12.5mg PO BID, enalapril 10mg BID  - Continue Lasix 20mg PO daily on discharge  - Goal BP < 130/90, if BP remains elevated, can further increase carvedilol; would adjust slowly to avoid dropping BP  - Patient has follow up appointment with Dr. Patterson on 5/26 at 10am    Kristin Ryan MD  Cardiology Fellow  [phone]  All Cardiology service information can be found 24/7 on [email or web address], password: [email or web address]

## 2022-05-21 NOTE — PROGRESS NOTE ADULT - SUBJECTIVE AND OBJECTIVE BOX
Interval History: Feels that fluid is back to baseline, still hypertensive this morning    Review Of Systems:  Constitutional: [ ] Fever [ ] Chills [ ] Fatigue [ ] Weight change   HEENT: [ ] Blurred vision [ ] Eye Pain [ ] Headache [ ] Runny nose [ ] Sore Throat   Respiratory: [ ] Cough [ ] Wheezing [ ] Shortness of breath  Cardiovascular: [ ] Chest Pain [ ] Palpitations [ ] BEATTY [ ] PND [ ] Orthopnea  Gastrointestinal: [ ] Abdominal Pain [ ] Diarrhea [ ] Constipation [ ] Hemorrhoids [ ] Nausea [ ] Vomiting  Genitourinary: [ ] Nocturia [ ] Dysuria [ ] Incontinence  Extremities: [ ] Swelling [ ] Joint Pain  Neurologic: [ ] Focal deficit [ ] Paresthesias [ ] Syncope  Lymphatic: [ ] Swelling [ ] Lymphadenopathy   Skin: [ ] Rash [ ] Ecchymoses [ ] Wounds [ ] Lesions  Psychiatry: [ ] Depression [ ] Suicidal/Homicidal Ideation [ ] Anxiety [ ] Sleep Disturbances  [x] 10 point review of systems is otherwise negative except as mentioned above    Medications:  carvedilol 12.5 milliGRAM(s) Oral every 12 hours  enalapril 10 milliGRAM(s) Oral every 12 hours  furosemide    Tablet 20 milliGRAM(s) Oral daily  guaiFENesin Oral Liquid (Sugar-Free) 200 milliGRAM(s) Oral every 6 hours PRN  heparin   Injectable 5000 Unit(s) SubCutaneous every 12 hours  potassium chloride  10 mEq/100 mL IVPB 10 milliEquivalent(s) IV Intermittent every 1 hour    Vitals:  ICU Vital Signs Last 24 Hrs  T(C): 36.7 (21 May 2022 09:50), Max: 36.9 (20 May 2022 14:27)  T(F): 98.1 (21 May 2022 09:50), Max: 98.4 (20 May 2022 14:27)  HR: 108 (21 May 2022 10:05) (95 - 112)  BP: 144/104 (21 May 2022 10:05) (131/96 - 147/113)  BP(mean): --  ABP: --  ABP(mean): --  RR: 18 (21 May 2022 10:05) (18 - 24)  SpO2: 98% (21 May 2022 10:05) (94% - 100%)    Daily     Daily   I&O's Summary    20 May 2022 07:01  -  21 May 2022 07:00  --------------------------------------------------------  IN: 1100 mL / OUT: 3400 mL / NET: -2300 mL    Physical Exam:  Appearance:  NAD, comfortable in room air  HENT: NC/AT  Cardiovascular: Regular rate and rhythm, no LE Edema or JVD Present  Respiratory: Clear to auscultation bilaterally  Gastrointestinal: Soft  Psychiatry: [x] AAOx3  [x] Follows Commands  Skin: Intact    Labs:                        11.5   7.76  )-----------( 539      ( 21 May 2022 05:54 )             33.6     05-21    139  |  105  |  7   ----------------------------<  74  3.4<L>   |  17<L>  |  0.61    Ca    9.3      21 May 2022 05:54    TPro  6.8  /  Alb  3.3  /  TBili  0.3  /  DBili  x   /  AST  15  /  ALT  26  /  AlkPhos  120  05-21    PT/INR - ( 20 May 2022 05:50 )   PT: 12.4 sec;   INR: 1.07 ratio         PTT - ( 20 May 2022 05:50 )  PTT:29.4 sec      Serum Pro-Brain Natriuretic Peptide: 6729 pg/mL (05-21 @ 05:54)  Serum Pro-Brain Natriuretic Peptide: 6133 pg/mL (05-19 @ 06:30)    Interpretation of Telemetry: not on telemetry

## 2022-05-22 LAB
ALBUMIN SERPL ELPH-MCNC: 3.6 G/DL — SIGNIFICANT CHANGE UP (ref 3.3–5)
ALP SERPL-CCNC: 117 U/L — SIGNIFICANT CHANGE UP (ref 40–120)
ALT FLD-CCNC: 27 U/L — SIGNIFICANT CHANGE UP (ref 4–33)
ANION GAP SERPL CALC-SCNC: 14 MMOL/L — SIGNIFICANT CHANGE UP (ref 7–14)
APTT BLD: 25.2 SEC — LOW (ref 27–36.3)
AST SERPL-CCNC: 18 U/L — SIGNIFICANT CHANGE UP (ref 4–32)
BASOPHILS # BLD AUTO: 0.03 K/UL — SIGNIFICANT CHANGE UP (ref 0–0.2)
BASOPHILS NFR BLD AUTO: 0.4 % — SIGNIFICANT CHANGE UP (ref 0–2)
BILIRUB SERPL-MCNC: 0.3 MG/DL — SIGNIFICANT CHANGE UP (ref 0.2–1.2)
BLD GP AB SCN SERPL QL: NEGATIVE — SIGNIFICANT CHANGE UP
BUN SERPL-MCNC: 9 MG/DL — SIGNIFICANT CHANGE UP (ref 7–23)
CALCIUM SERPL-MCNC: 9.5 MG/DL — SIGNIFICANT CHANGE UP (ref 8.4–10.5)
CHLORIDE SERPL-SCNC: 107 MMOL/L — SIGNIFICANT CHANGE UP (ref 98–107)
CO2 SERPL-SCNC: 16 MMOL/L — LOW (ref 22–31)
CREAT SERPL-MCNC: 0.69 MG/DL — SIGNIFICANT CHANGE UP (ref 0.5–1.3)
EGFR: 121 ML/MIN/1.73M2 — SIGNIFICANT CHANGE UP
EOSINOPHIL # BLD AUTO: 0.12 K/UL — SIGNIFICANT CHANGE UP (ref 0–0.5)
EOSINOPHIL NFR BLD AUTO: 1.6 % — SIGNIFICANT CHANGE UP (ref 0–6)
FIBRINOGEN PPP-MCNC: 524 MG/DL — HIGH (ref 330–520)
GLUCOSE SERPL-MCNC: 75 MG/DL — SIGNIFICANT CHANGE UP (ref 70–99)
HCT VFR BLD CALC: 34.1 % — LOW (ref 34.5–45)
HGB BLD-MCNC: 11.6 G/DL — SIGNIFICANT CHANGE UP (ref 11.5–15.5)
IANC: 4.27 K/UL — SIGNIFICANT CHANGE UP (ref 1.8–7.4)
IMM GRANULOCYTES NFR BLD AUTO: 0.6 % — SIGNIFICANT CHANGE UP (ref 0–1.5)
INR BLD: 1.08 RATIO — SIGNIFICANT CHANGE UP (ref 0.88–1.16)
LDH SERPL L TO P-CCNC: 334 U/L — HIGH (ref 135–225)
LYMPHOCYTES # BLD AUTO: 2.57 K/UL — SIGNIFICANT CHANGE UP (ref 1–3.3)
LYMPHOCYTES # BLD AUTO: 33.2 % — SIGNIFICANT CHANGE UP (ref 13–44)
MCHC RBC-ENTMCNC: 32.5 PG — SIGNIFICANT CHANGE UP (ref 27–34)
MCHC RBC-ENTMCNC: 34 GM/DL — SIGNIFICANT CHANGE UP (ref 32–36)
MCV RBC AUTO: 95.5 FL — SIGNIFICANT CHANGE UP (ref 80–100)
MONOCYTES # BLD AUTO: 0.7 K/UL — SIGNIFICANT CHANGE UP (ref 0–0.9)
MONOCYTES NFR BLD AUTO: 9 % — SIGNIFICANT CHANGE UP (ref 2–14)
NEUTROPHILS # BLD AUTO: 4.27 K/UL — SIGNIFICANT CHANGE UP (ref 1.8–7.4)
NEUTROPHILS NFR BLD AUTO: 55.2 % — SIGNIFICANT CHANGE UP (ref 43–77)
NRBC # BLD: 0 /100 WBCS — SIGNIFICANT CHANGE UP
NRBC # FLD: 0 K/UL — SIGNIFICANT CHANGE UP
PLATELET # BLD AUTO: 505 K/UL — HIGH (ref 150–400)
POTASSIUM SERPL-MCNC: 4.3 MMOL/L — SIGNIFICANT CHANGE UP (ref 3.5–5.3)
POTASSIUM SERPL-SCNC: 4.3 MMOL/L — SIGNIFICANT CHANGE UP (ref 3.5–5.3)
PROT SERPL-MCNC: 7.2 G/DL — SIGNIFICANT CHANGE UP (ref 6–8.3)
PROTHROM AB SERPL-ACNC: 12.5 SEC — SIGNIFICANT CHANGE UP (ref 10.5–13.4)
RBC # BLD: 3.57 M/UL — LOW (ref 3.8–5.2)
RBC # FLD: 14.3 % — SIGNIFICANT CHANGE UP (ref 10.3–14.5)
RH IG SCN BLD-IMP: POSITIVE — SIGNIFICANT CHANGE UP
SODIUM SERPL-SCNC: 137 MMOL/L — SIGNIFICANT CHANGE UP (ref 135–145)
URATE SERPL-MCNC: 7.9 MG/DL — HIGH (ref 2.5–7)
WBC # BLD: 7.74 K/UL — SIGNIFICANT CHANGE UP (ref 3.8–10.5)
WBC # FLD AUTO: 7.74 K/UL — SIGNIFICANT CHANGE UP (ref 3.8–10.5)

## 2022-05-22 PROCEDURE — 99232 SBSQ HOSP IP/OBS MODERATE 35: CPT | Mod: GC

## 2022-05-22 RX ORDER — DIPHENHYDRAMINE HCL 50 MG
25 CAPSULE ORAL AT BEDTIME
Refills: 0 | Status: DISCONTINUED | OUTPATIENT
Start: 2022-05-22 | End: 2022-05-24

## 2022-05-22 RX ORDER — CARVEDILOL PHOSPHATE 80 MG/1
25 CAPSULE, EXTENDED RELEASE ORAL EVERY 12 HOURS
Refills: 0 | Status: DISCONTINUED | OUTPATIENT
Start: 2022-05-22 | End: 2022-05-24

## 2022-05-22 RX ORDER — ACETAMINOPHEN 500 MG
975 TABLET ORAL EVERY 6 HOURS
Refills: 0 | Status: DISCONTINUED | OUTPATIENT
Start: 2022-05-22 | End: 2022-05-24

## 2022-05-22 RX ORDER — IBUPROFEN 200 MG
600 TABLET ORAL ONCE
Refills: 0 | Status: DISCONTINUED | OUTPATIENT
Start: 2022-05-22 | End: 2022-05-23

## 2022-05-22 RX ADMIN — Medication 10 MILLIGRAM(S): at 09:49

## 2022-05-22 RX ADMIN — CARVEDILOL PHOSPHATE 25 MILLIGRAM(S): 80 CAPSULE, EXTENDED RELEASE ORAL at 22:20

## 2022-05-22 RX ADMIN — Medication 10 MILLIGRAM(S): at 22:20

## 2022-05-22 RX ADMIN — CARVEDILOL PHOSPHATE 12.5 MILLIGRAM(S): 80 CAPSULE, EXTENDED RELEASE ORAL at 09:49

## 2022-05-22 RX ADMIN — HEPARIN SODIUM 5000 UNIT(S): 5000 INJECTION INTRAVENOUS; SUBCUTANEOUS at 22:20

## 2022-05-22 RX ADMIN — Medication 20 MILLIGRAM(S): at 22:20

## 2022-05-22 RX ADMIN — Medication 200 MILLIGRAM(S): at 04:04

## 2022-05-22 RX ADMIN — Medication 975 MILLIGRAM(S): at 14:48

## 2022-05-22 NOTE — CHART NOTE - NSCHARTNOTEFT_GEN_A_CORE
BPs reviewed with Cardiology attending, Dr. Leblanc. Patient noted to have persistently elevated diastolic BPs over the last 24 hours (90-110s), despite increasing medication yesterday morning. Patient continues to be asymptomatic at this time. Will increase Coreg to 25mg BID, and c/w Enalapril 10mg BID. Will take BPs with manual cuff to evaluate diastolics further. HF team to see patient tomorrow 5/23.      D/w Dr. Sheng RFrankel PGY3
Patient noted to have multiple severe range BPs, met criteria by diastolics in 110s persistently. She received increased dose of Carvedilol 12.5mg BID and Enalapril 10mg BID this morning. Patient given Lab 20 and 40mg IVP for severe range BPs. Currently asymptomatic.   Spoke with HF team NP to review BPs. At this time, would recommend holding further pushes of medication and allow increased regimen to take effect. Will take manual BP to confirm diastolic at next VS check. Will recontact Cardiology if systolics increase to 120s or greater. Will continue to monitor closely.     To be d/w Dr. Tetelman RFrankel PGY3
Patient with persistent severe range blood pressures (diastolic only). Discussed plan with heart failure team. State they will be seeing patient shortly, however ok to give immediate acting antihypertensive such as labetalol in accordance with ob hypertensive urgency protocol.     Kaitlin Padilla, PGY3
Pt seen and examined at bedside. Denies SOB, CP, palpitations. Reports some nasal congestion.  OOB, on room air  -140s/100s, HR 110s  f/u am HELLP labs  Continue telemetry monitoring  f/u Cardio consult for medication adjustment   f/u MFM consult
Pt seen and re-evaluated with Dr. Lord following TTE read as below     < from: Transthoracic Echocardiogram (22 @ 10:04) >      Patient name: ANÍBAL LAZO  YOB: 1995   Age: 26 (F)   MR#: [id number]  Study Date: 2022  [address] FRANCISCO Poole  Study quality: Technically good  Referring Physician: Luciano Melendez MD  Blood Pressure: 109/76 mmHg  Height: 152 cm  Weight: 82 kg  BSA: 1.8 m2  ------------------------------------------------------------------------  PROCEDURE: Transthoracic echocardiogram with 2-D, M-Mode  and complete spectral and color flow Doppler.  INDICATION: Dyspnea, unspecified (R06.00)  ------------------------------------------------------------------------  DIMENSIONS:  Dimensions:     Normal Values:  LA:     2.5 cm    2.0 - 4.0 cm  Ao:     2.5 cm    2.0 - 3.8 cm  SEPTUM: 0.8 cm    0.6 - 1.2 cm  PWT:    0.8 cm    0.6 - 1.1 cm  LVIDd:  5.4 cm    3.0 - 5.6 cm  LVIDs:  3.9 cm    1.8 - 4.0 cm  Derived Variables:  LVMI: 87 g/m2  RWT: 0.29  Fractional short: 28 %  Ejection Fraction (Modified Mane Rule): 33 %  ------------------------------------------------------------------------  OBSERVATIONS:  Mitral Valve: Normal mitral valve. Moderate mitral  regurgitation.  Aortic Root: Normal aortic root.  Aortic Valve: Normal trileaflet aortic valve. No aortic  valve regurgitation seen.  Left Atrium: Normal left atrium.  LA volume index = 28  cc/m2.  Left Ventricle: Severe global left ventricular systolic  dysfunction. Normal left ventricular internal dimensions  and wall thicknesses. Increased E/e'  is consistent with  elevated left ventricularfilling pressure.  Right Heart: Normal right atrium. Normal right ventricular  size with decreased right ventricular systolic function.  Normal tricuspid valve. Moderate-severe tricuspid  regurgitation. Normal pulmonic valve.  Pericardium/PleuraNormal pericardium with no pericardial  effusion.  Hemodynamic: Estimated right ventricular systolic pressure  equals 52 mm Hg, assuming right atrial pressure equals 8 mm  Hg, consistent with moderate pulmonary hypertension.  ------------------------------------------------------------------------  CONCLUSIONS:  1. Moderate mitral regurgitation.  2. Severe global left ventricular systolic dysfunction. EF  30-35%.  3. Increased E/e'  is consistent with elevated left  ventricular filling pressure.  4. Normal right ventricular size with decreased right  ventricular systolic function.  5. Normal tricuspid valve. Moderate-severe tricuspid  regurgitation.  6. Estimated pulmonary artery systolic pressure equals 52  mm Hg, assuming right atrial pressure equals 8  mm Hg,  consistent with moderate pulmonary hypertension.  **These findings are consistent with a peripartum  cardiomyopathy, in the appropriate clinical circumstances.  ADDENDUM 2022: Include LVEF in conclusions  ------------------------------------------------------------------------  Revised on  2022 - :37:46 by Indio Farr M.D.  Confirmed on  2022 - :38:20 by Indio Farr M.D.  ------------------------------------------------------------------------    < end of copied text >        Pt feels clinically improved at this time. SOB resolved. Denies CP, palpitations.  Tolerating diet without nausea/vomiting. Overall feels improved.       25yo  PPD#5 s/p  c/b gHTN now admitted with postpartum severe PEC, r/o postpartum cardiomyopathy. Overnight, blood pressures improved with standing antihypertensives. Patient s/p Lasix 2/2 pulmonary edema, no longer requring O2 and symptomatically improving. TTE concerning for postpartum cardiomyopathy.     #sPEC  - continue Keppra 500BID x 24 hours  - BP control with Labetalol 200BID, will discontinue Procardia 30xL  - s/p Procardia 10 IR, Hydral 10 IVP    #Shortness of breath  - DDx sPEC vs. postpartum cardiomyopathy   - CTPA (): Cardiomegaly and bilateral ground glass opacities with bilateral pleural effusions likely representing pulmonary edema   - CXR (): pulmonary vascular congestion - p  - s/p Lasix 20 IVP; strict I&O  - TTE (): consistent with peripartum cardiomyopathy EF 30-35%  - Tele monitoring, Cards consult  - Repeat CXR    #Postpartum  - Reg Diet, SLIV  - HSQ and SCD for DVT PPx  - breast pump at bedside    Tommie PGY3
R3 OB Note    Patient evaluated in triage with triage providers. Pt presents with shortness of breath and elevated blood pressures at home. Denies chest pain, vision changes, headache, abdominal pain.     VS  BP: 158/102, 155/97, 148/98, 154/103, 159/104, 155/102, 160/97, 173/107, 74/105, 166/102  HR:   spO2: 92-97% on RA    PE:  Gen: NAD  Resp: crackles in b/l bases, unlabored on RA  GI: soft, nontender, nondistended  Ext: 1+ edema b/l, no calf tenderness    25yo  PPD#4 s/p  c/b gHTN now presenting with postpartum severe PEC, r/o postpartum cardiomyopathy.    - CXR, EKG, CTPA  - s/p Procardia 10IR, Hydral 10IVP  - STAT HELLP labs  - Keppra for seizure ppx 2/2 concern for pulmonary edema      C. Diamant, PGY-3  d/w Dr. Clark
PNC  Provider:  Dr Jules    Patient is a 27y/o  P1,  s/p  on 2022  ,presenting  with elevated BPs and + 2 pedal edema.  Reports  headache 3/10.    Denies nausea, vomiting, visual disturbances or RUQ/epigastric  pain.    Meds: PNV  NKDA       Patient had elevated BPs gHTN  No Magnesium sulfate IP  No D/C meds.    BP Range:  154- 173/   Heart: tachycardia  Lungs:  Expiratory wheezing in apex. Rhonchi in lt. lower lobe  Abdomen: soft  Lower extremities: +2 bilateral pedal edema.    DTRs : wnl      Orders:  pec labs. ekg, ctpa, chest x ray, hellp labs & rrvp.  S/P 10mg IR for severe range BPs    Discussed patient with Dr. Melendez.  Plan:  CTPA,  Chest x ray, echo, portable chest xray and  hellp labs.  Discussed also discussed with patient with Dr. Sequeira.  ANGELA Bajwa also in to talk to patient.  Report given to night staff, pt. will be admitted for further observation, monitoring and management plan.
Pt seen and evaluated by Cardiology. Pt cleared for transfer to Floor on Remote Tele.   Medications adjusted per cardiology - discontinue procardia and labetalol, start or Lasix 20 IVP daily, Coreg 3.125 daily, Enalapril Daily   - Will continue on tele  - Monitor I/O  - 24hr Keppra for seizure ppx  - AM HELLP labs ordered    dw Dr. Pop Reilly PGY3

## 2022-05-22 NOTE — CHART NOTE - NSCHARTNOTESELECT_GEN_ALL_CORE
R3 Chart Update
Attending Note
Event Note
OB
R3 BP Note
R3 BP Note
R3 Ob Chart Note
Post Partum Triage Note/Event Note

## 2022-05-22 NOTE — PROVIDER CONTACT NOTE (OTHER) - RECOMMENDATIONS
BP medications given as ordered
HR palpated by this RN, radial pulse regular rate 103. Provider notified of findings
MD Notified and will come to bedside to discuss all concerns with patient.

## 2022-05-22 NOTE — PROGRESS NOTE ADULT - NSPROGADDITIONALINFOA_GEN_ALL_CORE
MFM Fellow Addendum: Ms. Mitchell is a 26 year old  now PP#8 s/p . Postpartum course complicated by preeclampsia with severe features and postpartum cardiomyopathy. Patient is symptomatically improving but continues to have elevated diastolic blood pressures. No chest pain, shortness of breath, edema, dizziness or lightheadedness. Currently maintained on Enalapril BID and Coreg BID, diuretic PO daily. Continue to comanage patient with cardiology colleagues to optimize status to prepare for discharge. Patient strongly desires discharge to home although she does understand indications for inpatient management. Support provided.  consultation placed for additional resources. Close interval follow up with OBGYN and Cardiology team.   Patient seen and evaluated with Dr. Ramo Andre MD  Fellow, Maternal Fetal Medicine MFM Fellow Addendum: Ms. Mitchell is a 26 year old  now PP#8 s/p . Postpartum course complicated by preeclampsia with severe features and postpartum cardiomyopathy. Patient is symptomatically improving but continues to have elevated diastolic blood pressures. No chest pain, shortness of breath, edema, dizziness or lightheadedness. Currently maintained on Enalapril BID and Coreg BID, diuretic PO daily. Continue to co-manage patient with Cardiology colleagues to optimize status to prepare for discharge. Patient strongly desires discharge to home although she does understand indications for inpatient management. Support provided.  consultation placed for additional resources. Close interval follow up with OBGYN and Cardiology team.   Patient seen and evaluated with Dr. Ramo Andre MD  Fellow, Maternal Fetal Medicine

## 2022-05-22 NOTE — PROGRESS NOTE ADULT - SUBJECTIVE AND OBJECTIVE BOX
R3 OB ANTEPARTUM NOTE    Patient seen and examined at bedside, no acute overnight events. No acute complaints.   Denies HA, vision changes, RUQ/epigastric pain. Denies CP, SOB, N/V, fevers/chills.  Tolerating regular diet. Voiding freely. Ambulating without difficulty. Normal lochia.    Vital Signs Last 24 Hours  T(C): 36.8 (05-22-22 @ 01:57), Max: 36.8 (05-22-22 @ 01:57)  HR: 105 (05-22-22 @ 01:57) (99 - 112)  BP: 134/94 (05-22-22 @ 01:57) (119/89 - 145/113)  RR: 17 (05-22-22 @ 01:57) (16 - 18)  SpO2: 97% (05-22-22 @ 01:57) (94% - 100%)    Physical Exam:  General: NAD  Chest: nonlabored breathing  Abdomen: Soft, non-tender  Ext: No pain or swelling    Labs:             11.5   7.76  )-----------( 539      ( 05-21 @ 05:54 )             33.6     05-21 @ 05:54    139  |  105  |  7   ----------------------------<  74  3.4   |  17  |  0.61    Ca    9.3      05-21 @ 05:54    TPro  6.8  /  Alb  3.3  /  TBili  0.3  /  DBili  x   /  AST  15  /  ALT  26  /  AlkPhos  120  05-21 @ 05:54    PT/INR - ( 05-20 @ 05:50 )   PT: 12.4 sec;   INR: 1.07 ratio    PTT - ( 05-20 @ 05:50 )  PTT:29.4 sec    Uric Acid: (05-20 @ 05:50)  8.4      Fibrinogen: (05-20 @ 05:50)  512      LDH: (05-20 @ 05:50)  307        MEDICATIONS  (STANDING):  carvedilol 12.5 milliGRAM(s) Oral every 12 hours  enalapril 10 milliGRAM(s) Oral every 12 hours  furosemide    Tablet 20 milliGRAM(s) Oral daily  heparin   Injectable 5000 Unit(s) SubCutaneous every 12 hours    MEDICATIONS  (PRN):  guaiFENesin Oral Liquid (Sugar-Free) 200 milliGRAM(s) Oral every 6 hours PRN Cough

## 2022-05-22 NOTE — PROVIDER CONTACT NOTE (OTHER) - BACKGROUND
PT S/P VD on 5/14. On constant telmonitoring for sinus tachycardia.
Patient is a PPR here with PEC. Cardiology following.
Pt is PP readmit from 5/14 vaginal delivery, on tele monitoring for DX of cardiomyopathy.

## 2022-05-22 NOTE — PROVIDER CONTACT NOTE (OTHER) - ACTION/TREATMENT ORDERED:
No new orders at this time, will continue to monitor.
As per MD reassess BP in half hour.
No new orders at this time, will continue to monitor.

## 2022-05-22 NOTE — PROVIDER CONTACT NOTE (OTHER) - SITUATION
Pt called RN to request that MD come to bedside to discuss current level of care
8t tele tech called to report pt HR is 121
Patient with /114 and

## 2022-05-22 NOTE — PROGRESS NOTE ADULT - ASSESSMENT
25yo  PPD#8 s/p  c/b gHTN now admitted with postpartum cardiomyopathy/sPEC. s/p Keppra x24h for seizure ppx. Continued titration of antiHTN regimen by Cardiology. 1X Tele event overnight with HR to 121, palpated 103 by RN. Pt symptomatically improved.     #sPEC  - goal BPs: <140/90  - BP control Enalapril 10 mg BID, Coreg 12.5 BID  - continue titration of antiHTN regimen by HF/Cards  - s/p Keppra 500BID x 24 hours    #Cardiomyopathy  - Continue tele monitoring for arrhythmias  - Daily Lasix 20 IVP  - Replete lytes PRN  - Strict I/Os  - CTPA (): Cardiomegaly and bilateral ground glass opacities with bilateral pleural effusions likely representing pulmonary edema   - CXR (): Moderate interstitial edema -> (): Indistinctness of pulmonary vasculature without significant change vs CXR 2022, concerning for persistent pulmonary edema; bilateral pleural effusions.  - TTE (): consistent with peripartum cardiomyopathy EF 30-35%  - Appreciate HF team consult     #postpartum state  - No pain.   - Reg Diet, SLIV  - VTE ppx: HSQ and SCD for DVT ppx  - Pump at bedside  - Contraception: pt counseled extensively on risk of subsequent pregnancy with current diagnosis; pt to discuss options with private attending    Natanael MARCH

## 2022-05-22 NOTE — PROGRESS NOTE ADULT - ATTENDING COMMENTS
FREIDA Attending note.  Post partum HTN disorder with PPCM.  Pt remains unstable, requiring medication titration.  Recommend SW and psychosocial management due to birth trauma and SMM and the patient at risk for PTSD and depression/anxiety.    I have seen and examined the patient and fully participated in her care and agree with the evaluation and the assessment and plan above by the Fellow.    MARY FITCHM

## 2022-05-22 NOTE — PROGRESS NOTE ADULT - SUBJECTIVE AND OBJECTIVE BOX
patient admitted at this time with post partum cardiomyopathy at this time  overalls he appears to be improved, breathing normal, no pain issues reported by patient  however, her bps are still not well controlled  will need to discuss with cards about adjustments with medications  labs are otherwise normal at this time  await cards recommendations, will continue to monitor.

## 2022-05-22 NOTE — PROVIDER CONTACT NOTE (OTHER) - ASSESSMENT
Pt has no c.o H/A, SOB, Chest Pain or vision changes. Pt sitting up speaking without any signs of dyspnea. Pt has no edema or c/o calf pain. PT states she understands why she is on constant tele monitoring, however pt expresses concern stating, " How will you know if my Blood Pressure drops", This RN assured PT that we are taking Vital Signs q4 hours per the MD recommendations, should she have a change in status the BP would be taken more frequently. Pt responded "That does not seem secure, I would like to speak with the MD."
pt asymptomatic. Denies h/a SOB, dizziness, vision changes or pain. denies feelings of tachycardia or palpitations
Patient asymptomatic

## 2022-05-23 ENCOUNTER — APPOINTMENT (OUTPATIENT)
Dept: ANTEPARTUM | Facility: CLINIC | Age: 27
End: 2022-05-23

## 2022-05-23 DIAGNOSIS — I50.21 ACUTE SYSTOLIC (CONGESTIVE) HEART FAILURE: ICD-10-CM

## 2022-05-23 LAB
ALBUMIN SERPL ELPH-MCNC: 3.5 G/DL — SIGNIFICANT CHANGE UP (ref 3.3–5)
ALP SERPL-CCNC: 108 U/L — SIGNIFICANT CHANGE UP (ref 40–120)
ALT FLD-CCNC: 21 U/L — SIGNIFICANT CHANGE UP (ref 4–33)
ANION GAP SERPL CALC-SCNC: 17 MMOL/L — HIGH (ref 7–14)
APTT BLD: 31.6 SEC — SIGNIFICANT CHANGE UP (ref 27–36.3)
AST SERPL-CCNC: 16 U/L — SIGNIFICANT CHANGE UP (ref 4–32)
BASOPHILS # BLD AUTO: 0.04 K/UL — SIGNIFICANT CHANGE UP (ref 0–0.2)
BASOPHILS NFR BLD AUTO: 0.6 % — SIGNIFICANT CHANGE UP (ref 0–2)
BILIRUB SERPL-MCNC: 0.3 MG/DL — SIGNIFICANT CHANGE UP (ref 0.2–1.2)
BUN SERPL-MCNC: 9 MG/DL — SIGNIFICANT CHANGE UP (ref 7–23)
CALCIUM SERPL-MCNC: 9.6 MG/DL — SIGNIFICANT CHANGE UP (ref 8.4–10.5)
CHLORIDE SERPL-SCNC: 103 MMOL/L — SIGNIFICANT CHANGE UP (ref 98–107)
CO2 SERPL-SCNC: 15 MMOL/L — LOW (ref 22–31)
CREAT SERPL-MCNC: 0.61 MG/DL — SIGNIFICANT CHANGE UP (ref 0.5–1.3)
EGFR: 125 ML/MIN/1.73M2 — SIGNIFICANT CHANGE UP
EOSINOPHIL # BLD AUTO: 0.09 K/UL — SIGNIFICANT CHANGE UP (ref 0–0.5)
EOSINOPHIL NFR BLD AUTO: 1.3 % — SIGNIFICANT CHANGE UP (ref 0–6)
FIBRINOGEN PPP-MCNC: 518 MG/DL — SIGNIFICANT CHANGE UP (ref 330–520)
GLUCOSE SERPL-MCNC: 64 MG/DL — LOW (ref 70–99)
HCT VFR BLD CALC: 32.7 % — LOW (ref 34.5–45)
HGB BLD-MCNC: 11.1 G/DL — LOW (ref 11.5–15.5)
IANC: 3.85 K/UL — SIGNIFICANT CHANGE UP (ref 1.8–7.4)
IMM GRANULOCYTES NFR BLD AUTO: 0.6 % — SIGNIFICANT CHANGE UP (ref 0–1.5)
INR BLD: 1.12 RATIO — SIGNIFICANT CHANGE UP (ref 0.88–1.16)
LDH SERPL L TO P-CCNC: 304 U/L — HIGH (ref 135–225)
LYMPHOCYTES # BLD AUTO: 2.55 K/UL — SIGNIFICANT CHANGE UP (ref 1–3.3)
LYMPHOCYTES # BLD AUTO: 35.7 % — SIGNIFICANT CHANGE UP (ref 13–44)
MCHC RBC-ENTMCNC: 31.8 PG — SIGNIFICANT CHANGE UP (ref 27–34)
MCHC RBC-ENTMCNC: 33.9 GM/DL — SIGNIFICANT CHANGE UP (ref 32–36)
MCV RBC AUTO: 93.7 FL — SIGNIFICANT CHANGE UP (ref 80–100)
MONOCYTES # BLD AUTO: 0.58 K/UL — SIGNIFICANT CHANGE UP (ref 0–0.9)
MONOCYTES NFR BLD AUTO: 8.1 % — SIGNIFICANT CHANGE UP (ref 2–14)
NEUTROPHILS # BLD AUTO: 3.85 K/UL — SIGNIFICANT CHANGE UP (ref 1.8–7.4)
NEUTROPHILS NFR BLD AUTO: 53.7 % — SIGNIFICANT CHANGE UP (ref 43–77)
NRBC # BLD: 0 /100 WBCS — SIGNIFICANT CHANGE UP
NRBC # FLD: 0 K/UL — SIGNIFICANT CHANGE UP
NT-PROBNP SERPL-SCNC: 5730 PG/ML — HIGH
PLATELET # BLD AUTO: 497 K/UL — HIGH (ref 150–400)
POTASSIUM SERPL-MCNC: 4 MMOL/L — SIGNIFICANT CHANGE UP (ref 3.5–5.3)
POTASSIUM SERPL-SCNC: 4 MMOL/L — SIGNIFICANT CHANGE UP (ref 3.5–5.3)
PROT SERPL-MCNC: 6.7 G/DL — SIGNIFICANT CHANGE UP (ref 6–8.3)
PROTHROM AB SERPL-ACNC: 13 SEC — SIGNIFICANT CHANGE UP (ref 10.5–13.4)
RBC # BLD: 3.49 M/UL — LOW (ref 3.8–5.2)
RBC # FLD: 14.2 % — SIGNIFICANT CHANGE UP (ref 10.3–14.5)
SODIUM SERPL-SCNC: 135 MMOL/L — SIGNIFICANT CHANGE UP (ref 135–145)
URATE SERPL-MCNC: 8.6 MG/DL — HIGH (ref 2.5–7)
WBC # BLD: 7.15 K/UL — SIGNIFICANT CHANGE UP (ref 3.8–10.5)
WBC # FLD AUTO: 7.15 K/UL — SIGNIFICANT CHANGE UP (ref 3.8–10.5)

## 2022-05-23 PROCEDURE — 99233 SBSQ HOSP IP/OBS HIGH 50: CPT

## 2022-05-23 PROCEDURE — 99222 1ST HOSP IP/OBS MODERATE 55: CPT

## 2022-05-23 RX ORDER — HYDRALAZINE HCL 50 MG
20 TABLET ORAL EVERY 8 HOURS
Refills: 0 | Status: DISCONTINUED | OUTPATIENT
Start: 2022-05-23 | End: 2022-05-24

## 2022-05-23 RX ADMIN — CARVEDILOL PHOSPHATE 25 MILLIGRAM(S): 80 CAPSULE, EXTENDED RELEASE ORAL at 22:30

## 2022-05-23 RX ADMIN — HEPARIN SODIUM 5000 UNIT(S): 5000 INJECTION INTRAVENOUS; SUBCUTANEOUS at 10:22

## 2022-05-23 RX ADMIN — Medication 10 MILLIGRAM(S): at 22:29

## 2022-05-23 RX ADMIN — HEPARIN SODIUM 5000 UNIT(S): 5000 INJECTION INTRAVENOUS; SUBCUTANEOUS at 22:29

## 2022-05-23 RX ADMIN — CARVEDILOL PHOSPHATE 25 MILLIGRAM(S): 80 CAPSULE, EXTENDED RELEASE ORAL at 10:18

## 2022-05-23 RX ADMIN — Medication 20 MILLIGRAM(S): at 22:29

## 2022-05-23 RX ADMIN — Medication 10 MILLIGRAM(S): at 10:20

## 2022-05-23 NOTE — PROGRESS NOTE ADULT - ASSESSMENT
27yo  PPD#9 s/p  c/b gHTN now admitted with postpartum cardiomyopathy/sPEC. s/p Keppra x24h for seizure ppx. Continued titration of antiHTN regimen by Cardiology. Pt symptomatically improved.     #sPEC  - goal BPs: <140/90  - BP control Enalapril 10 mg BID, Coreg 25 BID  - continue titration of antiHTN regimen by HF/Cards  - s/p Keppra 500BID x 24 hours    #Cardiomyopathy  - Continue tele monitoring for arrhythmias  - Daily Lasix 20 PO  - Replete lytes PRN  - Strict I/Os  - CTPA (): Cardiomegaly and bilateral ground glass opacities with bilateral pleural effusions likely representing pulmonary edema   - CXR (): Moderate interstitial edema -> (): Indistinctness of pulmonary vasculature without significant change vs CXR 2022, concerning for persistent pulmonary edema; bilateral pleural effusions.  - TTE (): consistent with peripartum cardiomyopathy EF 30-35%  - Appreciate HF team consult     #Postpartum state  - No pain.   - Reg Diet, SLIV  - VTE ppx: HSQ and SCD for DVT ppx  - Pump at bedside  - Contraception: pt counseled extensively on risk of subsequent pregnancy with current diagnosis; pt to discuss options with private attending      Tommie PGY3 27yo  PPD#9 s/p  c/b gHTN now admitted with postpartum cardiomyopathy/sPEC. s/p Keppra x24h for seizure ppx. Continued titration of antiHTN regimen by Cardiology. Pt symptomatically improved.     #sPEC  - goal BPs: <140/90  - BP control Enalapril 10 mg BID, Coreg 25 BID  - continue titration of antiHTN regimen by HF/Cards  - s/p Keppra 500BID x 24 hours    #Cardiomyopathy  - Continue tele monitoring for arrhythmias  - Daily Lasix 20 PO  - Replete lytes PRN  - Strict I/Os  - CTPA (): Cardiomegaly and bilateral ground glass opacities with bilateral pleural effusions likely representing pulmonary edema   - CXR (): Moderate interstitial edema -> (): Indistinctness of pulmonary vasculature without significant change vs CXR 2022, concerning for persistent pulmonary edema; bilateral pleural effusions.  - TTE (): consistent with peripartum cardiomyopathy EF 30-35%  - Appreciate HF team consult     #Postpartum state  - No pain.   - Reg Diet, SLIV  - VTE ppx: HSQ and SCD for DVT ppx  - Pump at bedside  - Contraception: pt counseled extensively on risk of subsequent pregnancy with current diagnosis; pt to discuss options with private attending      Tommie PGY3      MFM Fellow Addendum    27yo  now PPD#9 admitted in setting of post partum cardiomyopathy and severe preeclampsia. VSS. Doing well. Managed on lasix 20mg qd, enalapril 10mg BID, coreg 25mg BID. Labs stable. Counseled heavily on role for contraception and need for close interval follow up with cardiology. Likely for d/c today.     Patient seen with Dr. Flowers (MFM attending)    Villa Mejía M.D. PGY-5  Maternal Fetal Medicine Fellow  Cell: [phone] if after 5pm or weekend ask labor and delivery for on call fellow

## 2022-05-23 NOTE — PROGRESS NOTE ADULT - SUBJECTIVE AND OBJECTIVE BOX
Post-partum Note,   She is a  26y woman who is now post-partum day: 9 readmitted with PEC w/SF and cardiomyopathy     Subjective:  The patient feels well.  She is ambulating.   She is tolerating regular diet.  She denies nausea and vomiting; denies fever.  She is voiding.  Her pain is controlled.  She reports normal postpartum bleeding.      Physical exam:    Vital Signs Last 24 Hrs  T(C): 36.4 (23 May 2022 08:55), Max: 36.7 (23 May 2022 05:53)  T(F): 97.6 (23 May 2022 08:55), Max: 98.1 (23 May 2022 05:53)  HR: 106 (23 May 2022 14:09) (103 - 112)  BP: 138/103 (23 May 2022 14:09) (127/90 - 144/114)  BP(mean): --  RR: 18 (23 May 2022 14:09) (18 - 20)  SpO2: 100% (23 May 2022 14:09) (97% - 100%)    Gen: NAD  Breast: Soft, nontender, not engorged.  Abdomen: Soft, nontender, no distension , firm uterine fundus at umbilicus.  Pelvic: Normal lochia noted  Ext: No calf tenderness    LABS:                        11.1   7.15  )-----------( 497      ( 23 May 2022 05:57 )             32.7       Rubella status:     Allergies    No Known Allergies    Intolerances      MEDICATIONS  (STANDING):  acetaminophen     Tablet .. 975 milliGRAM(s) Oral every 6 hours  carvedilol 25 milliGRAM(s) Oral every 12 hours  enalapril 10 milliGRAM(s) Oral every 12 hours  furosemide    Tablet 20 milliGRAM(s) Oral daily  heparin   Injectable 5000 Unit(s) SubCutaneous every 12 hours  ibuprofen  Tablet. 600 milliGRAM(s) Oral once    MEDICATIONS  (PRN):  diphenhydrAMINE 25 milliGRAM(s) Oral at bedtime PRN Insomnia  guaiFENesin Oral Liquid (Sugar-Free) 200 milliGRAM(s) Oral every 6 hours PRN Cough      25yo p1 PPD#9 s/p  c/b gHTN now admitted with PP  cardiomyopathy/sPEC. s/p Keppra x24h for seizure ppx.  Patient reports continued tachycardia     - BP control Enalapril 10 mg BID, Coreg 25 BID  - continue antihypertensive  - s/p Keppra 500BID x 24 hours  - Continue tele monitoring for arrhythmias  - Daily Lasix 20 P  encourage ambulation, pain control monitor symptoms

## 2022-05-23 NOTE — CONSULT NOTE ADULT - ASSESSMENT
Patient is a 26y old  Female , gestational HTN, S/p  on  and presents to [facility] last week with complaining of progressive BEATTY and BLE swelling. Pt. is found to have peripartum cardiomyopathy (EF 33%; LVIDd 5.4, mod-severe TR and mod PH). HF team was consulted and GMDT regimen started with outpatient HF follow up. EP is consulted for life vest.     peripartum cardiomyopathy    RECOMMENDATIONS:  - Given new peripartum cardiomyopathy, life vest discussed with patient, all risk, benefit and alternative discussed at length, all questions and concerns addressed. pt. voiced understanding and states she will make decision after she discusses with her family   - Continuous telemetric monitoring   - Monitor electrolytes and replete K to 4 and Mg to 2  - Continue GDMT and appreciate HF team rec.   - Continue care per primary team    Patient to be staffed with attending. Please await attending addendum

## 2022-05-23 NOTE — CONSULT NOTE ADULT - SUBJECTIVE AND OBJECTIVE BOX
Source: patient and Chart    HPI:  Patient is a 26y old  Female , gestational HTN, S/p  on  and presents to [facility] last week with complaining of  progressive BEATTY while walking up one flight of stairs and BLE swelling. Denies CP, PND, palpitation, dizziness. Pt. is found to have elevated /100; Pro-BNP 6,133;  CXR with increased pulmonary vasculature; Echo revealed peripartum cardiomyopathy (EF 33%; LVIDd 5.4, mod-severe TR and mod PH).  HF team was consulted and GMDT regimen tarted with outpatient HF follow up. EP is consulted for life vest.         PAST MEDICAL & SURGICAL HISTORY:  No pertinent past medical history      No significant past surgical history            MEDICATIONS  (STANDING):  acetaminophen     Tablet .. 975 milliGRAM(s) Oral every 6 hours  carvedilol 25 milliGRAM(s) Oral every 12 hours  enalapril 10 milliGRAM(s) Oral every 12 hours  furosemide    Tablet 20 milliGRAM(s) Oral daily  heparin   Injectable 5000 Unit(s) SubCutaneous every 12 hours  hydrALAZINE 20 milliGRAM(s) Oral every 8 hours    MEDICATIONS  (PRN):  diphenhydrAMINE 25 milliGRAM(s) Oral at bedtime PRN Insomnia  guaiFENesin Oral Liquid (Sugar-Free) 200 milliGRAM(s) Oral every 6 hours PRN Cough      FAMILY HISTORY:  FH: type 2 diabetes (Mother)   due to kidney failure        SOCIAL HISTORY:    LIVING SITUATION:  CIGARETTES: Denied  ALCOHOL: denied   ILLICIT DRUG USES: denied    REVIEW OF SYSTEMS:  CONSTITUTIONAL: No fever, weight loss, chills, shakes, or fatigue  EYES: No eye pain, visual disturbances, or discharge  ENMT:  No difficulty hearing, tinnitus, vertigo; No sinus or throat pain  NECK: No pain or stiffness  RESPIRATORY: No cough, wheezing, hemoptysis, or shortness of breath  CARDIOVASCULAR: No chest pain, +dyspnea, palpitations, dizziness, syncope, paroxysmal nocturnal dyspnea, orthopnea, or arm, + leg swelling  GASTROINTESTINAL: No abdominal  or epigastric pain, nausea, vomiting, hematemesis, diarrhea, constipation, melena or bright red blood.  GENITOURINARY: No dysuria, nocturia, hematuria, or urinary incontinence  NEUROLOGICAL: No headaches, memory loss, slurred speech, limb weakness, loss of strength, numbness, or tremors  MUSCULOSKELETAL: No joint pain or swelling, muscle, back, or extremity pain  PSYCHIATRIC: No depression, anxiety, or difficulty sleeping        Vital Signs Last 24 Hrs  T(C): 36.7 (23 May 2022 18:00), Max: 36.7 (23 May 2022 05:53)  T(F): 98 (23 May 2022 18:00), Max: 98.1 (23 May 2022 05:53)  HR: 109 (23 May 2022 18:34) (99 - 112)  BP: 142/109 (23 May 2022 18:34) (127/90 - 144/103)  BP(mean): --  RR: 19 (23 May 2022 18:00) (18 - 20)  SpO2: 99% (23 May 2022 18:00) (97% - 100%)    PHYSICAL EXAM:  GENERAL: Well appearing, speaking in full sentence, in NAD  HEART: S1S2 RRR; No murmurs, rubs, or gallops appreciated .  PULMONARY:CTABL, normal respiratory effort.  No rales, wheezing, or rhonchi appreciated bilaterally  ABDOMEN: Bowel sounds present, soft  EXTREMITIES:  Warm, well -perfused, no pedal edema, distal pulses present  NEUROLOGICAL:AOx3 ,  motor function grossly  intact    INTERPRETATION OF TELEMETRY: every 4 hours recorded Sinus tachycardia at 100-110s    ECG: Sinus tachycardia at 109bpm, HI 128MS, qrs 56ms, QT/QTc 328/441ms    I&O's Detail    22 May 2022 07:01  -  23 May 2022 07:00  --------------------------------------------------------  IN:  Total IN: 0 mL    OUT:    Voided (mL): 500 mL  Total OUT: 500 mL    Total NET: -500 mL          LABS:                        11.1   7.15  )-----------( 497      ( 23 May 2022 05:57 )             32.7         135  |  103  |  9   ----------------------------<  64<L>  4.0   |  15<L>  |  0.61    Ca    9.6      23 May 2022 05:57    TPro  6.7  /  Alb  3.5  /  TBili  0.3  /  DBili  x   /  AST  16  /  ALT  21  /  AlkPhos  108  05-23        PT/INR - ( 23 May 2022 05:57 )   PT: 13.0 sec;   INR: 1.12 ratio         PTT - ( 23 May 2022 05:57 )  PTT:31.6 sec    BNP  I&O's Detail    22 May 2022 07:01  -  23 May 2022 07:00  --------------------------------------------------------  IN:  Total IN: 0 mL    OUT:    Voided (mL): 500 mL  Total OUT: 500 mL    Total NET: -500 mL        Daily     Daily     RADIOLOGY & ADDITIONAL STUDIES:

## 2022-05-23 NOTE — PROGRESS NOTE ADULT - PROBLEM SELECTOR PLAN 1
Post partum cardiomyopathy.   Hypertensive.   Coreg has been uptitrated to 25 mg po BID, continue.   Continue Enalapril 10 mg po qd. Will d/w Dr. Patterson about incrasing enalapril dose, or adding hydralazine 10 mg po TID.   Continue Lasix 20 mg po qd.   Discussed keeping a low salt diet, avoid eating out from restaurants.  Informed patient that if she is to keep track of her weight, if weight is increasing >2 lbs a day, or if having SOB, worsening LE edema, could be a sign of fluid retention secondary to HF, and she should call our office [phone].   Follow up in HF clinic w/ Dr. Cory Patterson on 5/26/22 at 10 am. Post partum cardiomyopathy.   Hypertensive.   Coreg has been uptitrated to 25 mg po BID, continue.   Continue Enalapril 10 mg po qd. Will d/w Dr. Patterson about  adding hydralazine 10 mg po TID?  Continue Lasix 20 mg po qd.   Discussed keeping a low salt diet, avoid eating out from restaurants.  Informed patient that she is to keep track of her weight, if weight is increasing >2 lbs a day, or if having SOB, worsening LE edema, could be a sign of fluid retention secondary to HF, and she should call our office [phone].   Follow up in HF clinic w/ Dr. Cory Patterson on 5/26/22 at 10 am. Post partum cardiomyopathy.   Hypertensive.   Coreg has been uptitrated to 25 mg po BID, continue.   Continue Enalapril 10 mg po qd. Will d/w Dr. Patterson about  adding hydralazine 10 mg po TID?  Continue Lasix 20 mg po qd.   Discussed keeping a low salt diet, avoid eating out from restaurants.  Informed patient that she is to keep track of her weight, if weight is increasing >2 lbs a day, or if having SOB, worsening LE edema, could be a sign of fluid retention secondary to HF, and she should call our office [phone].   Follow up in HF clinic w/ Dr. Cory Patterson on 5/26/22 at 10 am.  As d/w Dr. Patterson, hold discharge today, get EP to evaluate for LifeVest, get proBNP in AM, get repeat TTE in AM.

## 2022-05-23 NOTE — PROGRESS NOTE ADULT - SUBJECTIVE AND OBJECTIVE BOX
Medications:  acetaminophen     Tablet .. 975 milliGRAM(s) Oral every 6 hours  carvedilol 25 milliGRAM(s) Oral every 12 hours  diphenhydrAMINE 25 milliGRAM(s) Oral at bedtime PRN  enalapril 10 milliGRAM(s) Oral every 12 hours  furosemide    Tablet 20 milliGRAM(s) Oral daily  guaiFENesin Oral Liquid (Sugar-Free) 200 milliGRAM(s) Oral every 6 hours PRN  heparin   Injectable 5000 Unit(s) SubCutaneous every 12 hours      Vitals:  Vital Signs Last 24 Hrs  T(C): 36.4 (23 May 2022 08:55), Max: 36.7 (23 May 2022 05:53)  T(F): 97.6 (23 May 2022 08:55), Max: 98.1 (23 May 2022 05:53)  HR: 106 (23 May 2022 14:09) (103 - 112)  BP: 138/103 (23 May 2022 14:09) (127/90 - 144/114)  BP(mean): --  RR: 18 (23 May 2022 14:09) (18 - 20)  SpO2: 100% (23 May 2022 14:09) (97% - 100%)    Daily     Daily     I&O's Detail    22 May 2022 07:01  -  23 May 2022 07:00  --------------------------------------------------------  IN:  Total IN: 0 mL    OUT:    Voided (mL): 500 mL  Total OUT: 500 mL    Total NET: -500 mL          Physical Exam:     General: No distress. Comfortable.  HEENT: EOM intact.  Neck: Neck supple. JVP not elevated. No masses  Chest: Clear to auscultation bilaterally  CV: Normal S1 and S2. No murmurs, rub, or gallops. Radial pulses normal.  Abdomen: Soft, non-distended, non-tender  Skin: No rashes or skin breakdown  Neurology: Alert and oriented times three. Sensation intact  Psych: Affect normal    Labs:                        11.1   7.15  )-----------( 497      ( 23 May 2022 05:57 )             32.7     05-23    135  |  103  |  9   ----------------------------<  64<L>  4.0   |  15<L>  |  0.61    Ca    9.6      23 May 2022 05:57    TPro  6.7  /  Alb  3.5  /  TBili  0.3  /  DBili  x   /  AST  16  /  ALT  21  /  AlkPhos  108  05-23    PT/INR - ( 23 May 2022 05:57 )   PT: 13.0 sec;   INR: 1.12 ratio         PTT - ( 23 May 2022 05:57 )  PTT:31.6 sec       Patient seen and examined. She states her SOB has improved, but when having a hot shower feels a bit SOB still.   No SOB at rest, orthopnea, PND, CP, palpitations. No BEATTY when walking in her room.         Medications:  acetaminophen     Tablet .. 975 milliGRAM(s) Oral every 6 hours  carvedilol 25 milliGRAM(s) Oral every 12 hours  diphenhydrAMINE 25 milliGRAM(s) Oral at bedtime PRN  enalapril 10 milliGRAM(s) Oral every 12 hours  furosemide    Tablet 20 milliGRAM(s) Oral daily  guaiFENesin Oral Liquid (Sugar-Free) 200 milliGRAM(s) Oral every 6 hours PRN  heparin   Injectable 5000 Unit(s) SubCutaneous every 12 hours      Vitals:  Vital Signs Last 24 Hrs  T(C): 36.4 (23 May 2022 08:55), Max: 36.7 (23 May 2022 05:53)  T(F): 97.6 (23 May 2022 08:55), Max: 98.1 (23 May 2022 05:53)  HR: 106 (23 May 2022 14:09) (103 - 112)  BP: 138/103 (23 May 2022 14:09) (127/90 - 144/114)  BP(mean): --  RR: 18 (23 May 2022 14:09) (18 - 20)  SpO2: 100% (23 May 2022 14:09) (97% - 100%)    Daily     Daily     I&O's Detail    22 May 2022 07:01  -  23 May 2022 07:00  --------------------------------------------------------  IN:  Total IN: 0 mL    OUT:    Voided (mL): 500 mL  Total OUT: 500 mL    Total NET: -500 mL          Physical Exam:     General: No distress. Comfortable.  HEENT: EOM intact.  Neck: Neck supple. JVP mildly elevated approx. 7 cm. No masses  Chest: Clear to auscultation bilaterally  CV: Normal S1 and S2. No murmurs, rub, or gallops. Radial pulses normal. No LE edema b/l  Abdomen: Soft, non-distended, non-tender  Skin: No rashes or skin breakdown  Neurology: Alert and oriented times three. Sensation intact  Psych: Affect normal    Labs:                        11.1   7.15  )-----------( 497      ( 23 May 2022 05:57 )             32.7     05-23    135  |  103  |  9   ----------------------------<  64<L>  4.0   |  15<L>  |  0.61    Ca    9.6      23 May 2022 05:57    TPro  6.7  /  Alb  3.5  /  TBili  0.3  /  DBili  x   /  AST  16  /  ALT  21  /  AlkPhos  108  05-23    PT/INR - ( 23 May 2022 05:57 )   PT: 13.0 sec;   INR: 1.12 ratio         PTT - ( 23 May 2022 05:57 )  PTT:31.6 sec       Patient seen and examined. She states her SOB has improved, but when having a hot shower feels a bit SOB still.   No SOB at rest, orthopnea, PND, CP, palpitations. No BEATTY when walking in her room.         Medications:  acetaminophen     Tablet .. 975 milliGRAM(s) Oral every 6 hours  carvedilol 25 milliGRAM(s) Oral every 12 hours  diphenhydrAMINE 25 milliGRAM(s) Oral at bedtime PRN  enalapril 10 milliGRAM(s) Oral every 12 hours  furosemide    Tablet 20 milliGRAM(s) Oral daily  guaiFENesin Oral Liquid (Sugar-Free) 200 milliGRAM(s) Oral every 6 hours PRN  heparin   Injectable 5000 Unit(s) SubCutaneous every 12 hours      Vitals:  Vital Signs Last 24 Hrs  T(C): 36.4 (23 May 2022 08:55), Max: 36.7 (23 May 2022 05:53)  T(F): 97.6 (23 May 2022 08:55), Max: 98.1 (23 May 2022 05:53)  HR: 106 (23 May 2022 14:09) (103 - 112)  BP: 138/103 (23 May 2022 14:09) (127/90 - 144/114)  BP(mean): --  RR: 18 (23 May 2022 14:09) (18 - 20)  SpO2: 100% (23 May 2022 14:09) (97% - 100%)    Daily     Daily     I&O's Detail    22 May 2022 07:01  -  23 May 2022 07:00  --------------------------------------------------------  IN:  Total IN: 0 mL    OUT:    Voided (mL): 500 mL  Total OUT: 500 mL    Total NET: -500 mL          Physical Exam:     General: No distress. Comfortable.  HEENT: EOM intact.  Neck: Neck supple. JVP mildly elevated approx. 7 cm. No masses  Chest: Clear to auscultation bilaterally  CV: S3 gallop noted.  No murmurs, rub, or gallops. Radial pulses normal. No LE edema b/l  Abdomen: Soft, non-distended, non-tender  Skin: No rashes or skin breakdown  Neurology: Alert and oriented times three. Sensation intact  Psych: Affect normal    Labs:                        11.1   7.15  )-----------( 497      ( 23 May 2022 05:57 )             32.7     05-23    135  |  103  |  9   ----------------------------<  64<L>  4.0   |  15<L>  |  0.61    Ca    9.6      23 May 2022 05:57    TPro  6.7  /  Alb  3.5  /  TBili  0.3  /  DBili  x   /  AST  16  /  ALT  21  /  AlkPhos  108  05-23    PT/INR - ( 23 May 2022 05:57 )   PT: 13.0 sec;   INR: 1.12 ratio         PTT - ( 23 May 2022 05:57 )  PTT:31.6 sec

## 2022-05-23 NOTE — PROGRESS NOTE ADULT - NS ATTEND AMEND GEN_ALL_CORE FT
Start hydralazine 20 mg po tid.  D/c home.  F/u with me in clinic. Start hydralazine 20 mg po tid.  D/c home.  F/u with me in clinic.    PA addendum:  As d/w Dr. Patterson, HOLD discharge today (informed OB team), get EP to evaluate for LifeVest, get proBNP (add on to today's labs), get repeat TTE in AM.

## 2022-05-23 NOTE — PROGRESS NOTE ADULT - ATTENDING COMMENTS
Patient seen and examined  Has no complains now, no shortness of breath  Blood pressure better controlled  Will continue to monitor as per cardiology  Agree with above assessment

## 2022-05-23 NOTE — PROGRESS NOTE ADULT - SUBJECTIVE AND OBJECTIVE BOX
R3 OB ANTEPARTUM NOTE    Patient seen and examined at bedside, no acute overnight events. No acute complaints.   Denies HA, vision changes, RUQ/epigastric pain. Denies CP, SOB, N/V, fevers/chills.  Tolerating regular diet. Voiding freely. Ambulating without difficulty. Normal lochia.      O:  Vitals:   Vital Signs Last 24 Hrs  T(C): 36.7 (23 May 2022 05:53), Max: 36.8 (22 May 2022 14:23)  T(F): 98.1 (23 May 2022 05:53), Max: 98.2 (22 May 2022 14:23)  HR: 109 (23 May 2022 05:53) (101 - 114)  BP: 127/94 (23 May 2022 05:53) (127/90 - 148/116)  BP(mean): --  RR: 18 (23 May 2022 05:53) (16 - 18)  SpO2: 97% (23 May 2022 05:53) (95% - 100%)    MEDICATIONS  (STANDING):  acetaminophen     Tablet .. 975 milliGRAM(s) Oral every 6 hours  carvedilol 25 milliGRAM(s) Oral every 12 hours  enalapril 10 milliGRAM(s) Oral every 12 hours  furosemide    Tablet 20 milliGRAM(s) Oral daily  heparin   Injectable 5000 Unit(s) SubCutaneous every 12 hours  ibuprofen  Tablet. 600 milliGRAM(s) Oral once    MEDICATIONS  (PRN):  diphenhydrAMINE 25 milliGRAM(s) Oral at bedtime PRN Insomnia  guaiFENesin Oral Liquid (Sugar-Free) 200 milliGRAM(s) Oral every 6 hours PRN Cough      Labs:  Blood type: O Positive  Rubella IgG: RPR: Negative                          11.6   7.74 >-----------< 505<H>    ( 05-22 @ 07:58 )             34.1<L>                        11.5   7.76 >-----------< 539<H>    ( 05-21 @ 05:54 )             33.6<L>                        11.3<L>   7.90 >-----------< 545<H>    ( 05-20 @ 16:34 )             32.6<L>    05-22-22 @ 06:30      137  |  107  |  9   ----------------------------<  75  4.3   |  16<L>  |  0.69    05-21-22 @ 05:54      139  |  105  |  7   ----------------------------<  74  3.4<L>   |  17<L>  |  0.61    05-20-22 @ 16:34      137  |  106  |  6<L>  ----------------------------<  85  3.7   |  17<L>  |  0.62        Ca    9.5      22 May 2022 06:30  Ca    9.3      21 May 2022 05:54  Ca    9.1      20 May 2022 16:34    TPro  7.2  /  Alb  3.6  /  TBili  0.3  /  DBili  x   /  AST  18  /  ALT  27  /  AlkPhos  117  05-22-22 @ 06:30  TPro  6.8  /  Alb  3.3  /  TBili  0.3  /  DBili  x   /  AST  15  /  ALT  26  /  AlkPhos  120  05-21-22 @ 05:54  TPro  7.1  /  Alb  3.5  /  TBili  0.4  /  DBili  x   /  AST  18  /  ALT  31  /  AlkPhos  126<H>  05-20-22 @ 16:34          Physical Exam:  General: NAD  Abdomen: soft, non-tender, non-distended, fundus firm  Vaginal: Lochia wnl  Extremities: No erythema/edema

## 2022-05-23 NOTE — PROGRESS NOTE ADULT - ASSESSMENT
26 year old Female P1  PP Day 4 with no known PMH besides gestational HTN which she was admitted for last evening. Patient presented to ED with c/o progressive BEATTY while walking up one flight of stairs and BLE swelling X 2days. She denies CP/SOB at rest/orthopnea and PND. No complaints of HA/dizziness or lightheadedness. No known family history of heart disease.      In the ED, found to have elevated BP (159/100) treated with labetalol 200mg Q8H; O2 Sat 93% treated with O2 4LNC and CXR with increased pulmonary vasculature treated with IV Lasix 20mg, she diuresed 5L. Echo revealed peripartum cardiomyopathy (EF 33%; LVIDd 5.4, mod-severe TR and mod PH).    Pertinent labs: Pro-BNP 6,133 and BUN/CR 6/0.46  RVP parainfluenza detected.

## 2022-05-24 ENCOUNTER — TRANSCRIPTION ENCOUNTER (OUTPATIENT)
Age: 27
End: 2022-05-24

## 2022-05-24 VITALS
HEART RATE: 104 BPM | TEMPERATURE: 98 F | RESPIRATION RATE: 16 BRPM | SYSTOLIC BLOOD PRESSURE: 108 MMHG | DIASTOLIC BLOOD PRESSURE: 75 MMHG | OXYGEN SATURATION: 100 %

## 2022-05-24 PROCEDURE — 93308 TTE F-UP OR LMTD: CPT

## 2022-05-24 PROCEDURE — 99232 SBSQ HOSP IP/OBS MODERATE 35: CPT

## 2022-05-24 PROCEDURE — 93306 TTE W/DOPPLER COMPLETE: CPT | Mod: 26

## 2022-05-24 RX ORDER — CARVEDILOL PHOSPHATE 80 MG/1
1 CAPSULE, EXTENDED RELEASE ORAL
Qty: 60 | Refills: 0
Start: 2022-05-24 | End: 2022-06-22

## 2022-05-24 RX ORDER — HYDRALAZINE HCL 50 MG
2 TABLET ORAL
Qty: 180 | Refills: 0
Start: 2022-05-24 | End: 2022-06-22

## 2022-05-24 RX ORDER — FUROSEMIDE 40 MG
1 TABLET ORAL
Qty: 30 | Refills: 0
Start: 2022-05-24 | End: 2022-06-22

## 2022-05-24 RX ADMIN — Medication 20 MILLIGRAM(S): at 05:57

## 2022-05-24 RX ADMIN — CARVEDILOL PHOSPHATE 25 MILLIGRAM(S): 80 CAPSULE, EXTENDED RELEASE ORAL at 10:23

## 2022-05-24 RX ADMIN — Medication 20 MILLIGRAM(S): at 16:46

## 2022-05-24 RX ADMIN — HEPARIN SODIUM 5000 UNIT(S): 5000 INJECTION INTRAVENOUS; SUBCUTANEOUS at 10:27

## 2022-05-24 RX ADMIN — Medication 10 MILLIGRAM(S): at 10:24

## 2022-05-24 NOTE — PROGRESS NOTE ADULT - ASSESSMENT
Patient is a 26y old  Female , gestational HTN, S/p  on  and presents to [facility] last week with complaining of progressive BEATTY and BLE swelling. Pt. is found to have peripartum cardiomyopathy (EF 33%; LVIDd 5.4, mod-severe TR and mod PH). HF team was consulted and GMDT regimen started with outpatient HF follow up. EP is consulted for life vest.     peripartum cardiomyopathy    RECOMMENDATIONS:  - Given new peripartum cardiomyopathy, life vest vs Jewel patch study discussed with patient,by Dr. Snowden and myself, all risk, benefit and alternative discussed at length, all questions and concerns addressed. pt. voiced understanding and she opted for enrolling Jewel patch study. Spoke to company and pt. will have an outpatient appointment on 2022 at Dr. Snowden's office for Jewel patch in the EP Clinic ( 4th floor Oncology building [facility] [address], [place], [address] [phone] )   - Continuous telemetric monitoring   - Monitor electrolytes and replete K to 4 and Mg to 2  - Continue GDMT and appreciate HF team rec.   - Continue care per primary team Patient is a 26y old  Female , gestational HTN, S/p  on  and presents to [facility] last week with complaining of progressive BEATTY and BLE swelling. Pt. is found to have peripartum cardiomyopathy (EF 33%; LVIDd 5.4, mod-severe TR and mod PH). HF team was consulted and GMDT regimen started with outpatient HF follow up. EP is consulted for life vest.     peripartum cardiomyopathy    RECOMMENDATIONS:  - Given new peripartum cardiomyopathy, life vest vs Jewel patch study discussed with patient,by Dr. Snowden and myself, all risk, benefit and alternative discussed at length, all questions and concerns addressed. pt. voiced understanding and she opted for enrolling Jewel patch study. Spoke to company and pt. will have an outpatient appointment on 2022 @ 1pm at Dr. Snowden's office for Jewel patch in the EP Clinic ( 4th floor Oncology building [facility] [address], [place], [address] [phone] )   - Continuous telemetric monitoring   - Monitor electrolytes and replete K to 4 and Mg to 2  - Continue GDMT and appreciate HF team rec.   - Continue care per primary team

## 2022-05-24 NOTE — PROGRESS NOTE ADULT - NS ATTEND AMEND GEN_ALL_CORE FT
Patient is a 26y old  Female , gestational HTN, S/p  on  and presents to [facility] last week with complaining of progressive BEATTY and BLE swelling. Pt. is found to have peripartum cardiomyopathy (EF 33%; LVIDd 5.4, mod-severe TR and mod PH). HF team was consulted and GMDT regimen started with outpatient HF follow up. EP is consulted for Jewel Patch Defibrillator. peripartum cardiomyopathy.

## 2022-05-24 NOTE — PROGRESS NOTE ADULT - SUBJECTIVE AND OBJECTIVE BOX
Interval history:  No overnight event  pt. has no complaints        PAST MEDICAL & SURGICAL HISTORY:  No pertinent past medical history    No significant past surgical history        MEDICATIONS  (STANDING):  acetaminophen     Tablet .. 975 milliGRAM(s) Oral every 6 hours  carvedilol 25 milliGRAM(s) Oral every 12 hours  enalapril 10 milliGRAM(s) Oral every 12 hours  furosemide    Tablet 20 milliGRAM(s) Oral daily  heparin   Injectable 5000 Unit(s) SubCutaneous every 12 hours  hydrALAZINE 20 milliGRAM(s) Oral every 8 hours    MEDICATIONS  (PRN):  diphenhydrAMINE 25 milliGRAM(s) Oral at bedtime PRN Insomnia  guaiFENesin Oral Liquid (Sugar-Free) 200 milliGRAM(s) Oral every 6 hours PRN Cough            Vital Signs Last 24 Hrs  T(C): 37 (24 May 2022 05:40), Max: 37.1 (23 May 2022 22:21)  T(F): 98.6 (24 May 2022 05:40), Max: 98.7 (23 May 2022 22:21)  HR: 108 (24 May 2022 10:20) (99 - 118)  BP: 122/95 (24 May 2022 10:20) (115/86 - 142/109)  BP(mean): --  RR: 18 (24 May 2022 10:20) (18 - 19)  SpO2: 99% (24 May 2022 10:20) (98% - 100%)            INTERPRETATION OF TELEMETRY: SR at 106    ECG:        LABS:                        11.1   7.15  )-----------( 497      ( 23 May 2022 05:57 )             32.7     05-23    135  |  103  |  9   ----------------------------<  64<L>  4.0   |  15<L>  |  0.61    Ca    9.6      23 May 2022 05:57    TPro  6.7  /  Alb  3.5  /  TBili  0.3  /  DBili  x   /  AST  16  /  ALT  21  /  AlkPhos  108  05-23        PT/INR - ( 23 May 2022 05:57 )   PT: 13.0 sec;   INR: 1.12 ratio         PTT - ( 23 May 2022 05:57 )  PTT:31.6 sec    I&O's Summary    23 May 2022 07:01  -  24 May 2022 07:00  --------------------------------------------------------  IN: 0 mL / OUT: 1450 mL / NET: -1450 mL      BNP  RADIOLOGY & ADDITIONAL STUDIES:      PHYSICAL EXAM:    GENERAL: In no apparent distress, well nourished, and hydrated.  HEART: Regular rate and rhythm; No murmurs, rubs, or gallops.  PULMONARY: Clear to auscultation and percussion.  No rales, wheezing, or rhonchi bilaterally.  ABDOMEN: Soft, Bowel sounds present  EXTREMITIES:  2+ Peripheral Pulses, No clubbing, cyanosis, or edema  NEUROLOGICAL: Grossly nonfocal

## 2022-05-24 NOTE — PROGRESS NOTE ADULT - ASSESSMENT
26 year old Female P1  PP Day 4 with no known PMH besides gestational HTN which she was admitted for last evening. Patient presented to ED with c/o progressive BEATTY while walking up one flight of stairs and BLE swelling X 2days. She denies CP/SOB at rest/orthopnea and PND. No complaints of HA/dizziness or lightheadedness. No known family history of heart disease.      In the ED, found to have elevated BP (159/100) treated with labetalol 200mg Q8H; O2 Sat 93% treated with O2 4LNC and CXR with increased pulmonary vasculature treated with IV Lasix 20mg, she diuresed 5L. Echo revealed peripartum cardiomyopathy (EF 33%; LVIDd 5.4, mod-severe TR and mod PH).    Pertinent labs: Pro-BNP 6,133 and BUN/CR 6/0.46  RVP parainfluenza detected.   Patient diuresed, and started on HF GDMT and was uptitrated.

## 2022-05-24 NOTE — DISCHARGE NOTE NURSING/CASE MANAGEMENT/SOCIAL WORK - PATIENT PORTAL LINK FT
You can access the FollowMyHealth Patient Portal offered by [facility] by registering at the following website: [email or web address]. By joining [facility]’s FollowMyHealth portal, you will also be able to view your health information using other applications (apps) compatible with our system.

## 2022-05-24 NOTE — PROGRESS NOTE ADULT - PROBLEM SELECTOR PLAN 1
Post partum cardiomyopathy.   BP improving on current regimen.   Patient planned to get Jewel patch by EP, outpatient on 5/31.   Coreg has been uptitrated to 25 mg po BID, continue.   Continue Enalapril 10 mg po qd.   Continue hydralazine 10 mg po TID.  Continue Lasix 20 mg po qd.   Discussed keeping a low salt diet, avoid eating out from restaurants.  Informed patient that she is to keep track of her weight, if weight is increasing >2 lbs a day, or if having SOB, worsening LE edema, could be a sign of fluid retention secondary to HF, and she should call our office [phone].   Follow up in HF clinic w/ Dr. Cory Patterson on 5/26/22 at 10 am.  Getting TTE now. Will follow up. Post partum cardiomyopathy.   BP improving on current regimen. ProBNP slightly lower than admission 5730.  Patient planned to get Jewel patch by EP, outpatient on 5/31.   Coreg has been uptitrated to 25 mg po BID, continue.   Continue Enalapril 10 mg po qd.   Continue hydralazine 10 mg po TID.  Continue Lasix 20 mg po qd.   Discussed keeping a low salt diet, avoid eating out from restaurants.  Informed patient that she is to keep track of her weight, if weight is increasing >2 lbs a day, or if having SOB, worsening LE edema, could be a sign of fluid retention secondary to HF, and she should call our office [phone].   Follow up in HF clinic w/ Dr. Cory Patterson on 5/26/22 at 10 am.  Getting TTE now. Will follow up.

## 2022-05-24 NOTE — PROGRESS NOTE ADULT - SUBJECTIVE AND OBJECTIVE BOX
OB Progress Note    S: Patient feels well. Pain is well controlled. She is tolerating a regular diet and passing flatus. She is voiding spontaneously, and ambulating without difficulty. Denies CP/SOB. Denies lightheadedness/dizziness. Denies N/V.    BP medication increased yesterday evening with addition of Hydral 20 TID. Cardiology consulting EP today, for possible life vest. Pt aware and discussing with family.     O:  Vitals:   Vital Signs Last 24 Hrs  T(C): 37 (24 May 2022 05:40), Max: 37.1 (23 May 2022 22:21)  T(F): 98.6 (24 May 2022 05:40), Max: 98.7 (23 May 2022 22:21)  HR: 99 (24 May 2022 05:40) (99 - 118)  BP: 115/86 (24 May 2022 05:40) (115/86 - 142/109)  BP(mean): --  RR: 18 (24 May 2022 05:40) (18 - 20)  SpO2: 98% (24 May 2022 05:40) (98% - 100%)    MEDICATIONS  (STANDING):  acetaminophen     Tablet .. 975 milliGRAM(s) Oral every 6 hours  carvedilol 25 milliGRAM(s) Oral every 12 hours  enalapril 10 milliGRAM(s) Oral every 12 hours  furosemide    Tablet 20 milliGRAM(s) Oral daily  heparin   Injectable 5000 Unit(s) SubCutaneous every 12 hours  hydrALAZINE 20 milliGRAM(s) Oral every 8 hours    MEDICATIONS  (PRN):  diphenhydrAMINE 25 milliGRAM(s) Oral at bedtime PRN Insomnia  guaiFENesin Oral Liquid (Sugar-Free) 200 milliGRAM(s) Oral every 6 hours PRN Cough      Labs:  Blood type: O Positive  Rubella IgG: RPR: Negative                          11.1<L>   7.15 >-----------< 497<H>    ( 05-23 @ 05:57 )             32.7<L>                        11.6   7.74 >-----------< 505<H>    ( 05-22 @ 07:58 )             34.1<L>    05-23-22 @ 05:57      135  |  103  |  9   ----------------------------<  64<L>  4.0   |  15<L>  |  0.61    05-22-22 @ 06:30      137  |  107  |  9   ----------------------------<  75  4.3   |  16<L>  |  0.69        Ca    9.6      23 May 2022 05:57  Ca    9.5      22 May 2022 06:30    TPro  6.7  /  Alb  3.5  /  TBili  0.3  /  DBili  x   /  AST  16  /  ALT  21  /  AlkPhos  108  05-23-22 @ 05:57  TPro  7.2  /  Alb  3.6  /  TBili  0.3  /  DBili  x   /  AST  18  /  ALT  27  /  AlkPhos  117  05-22-22 @ 06:30          Physical Exam:  General: NAD  Abdomen: soft, non-tender, non-distended, fundus firm  Vaginal: Lochia wnl  Extremities: No erythema/edema

## 2022-05-24 NOTE — PROGRESS NOTE ADULT - SUBJECTIVE AND OBJECTIVE BOX
MFM Fellow Progress Note      S:  Pt doing well this morning no complaints denies sob/cp.       O:  ICU Vital Signs Last 24 Hrs  T(C): 37 (24 May 2022 05:40), Max: 37.1 (23 May 2022 22:21)  T(F): 98.6 (24 May 2022 05:40), Max: 98.7 (23 May 2022 22:21)  HR: 108 (24 May 2022 10:20) (99 - 118)  BP: 122/95 (24 May 2022 10:20) (115/86 - 142/109)  BP(mean): --  ABP: --  ABP(mean): --  RR: 18 (24 May 2022 10:20) (18 - 19)  SpO2: 99% (24 May 2022 10:20) (98% - 100%)  Gen: well appearing                            11.1   7.15  )-----------( 497      ( 23 May 2022 05:57 )             32.7     05-23    135  |  103  |  9   ----------------------------<  64<L>  4.0   |  15<L>  |  0.61    Ca    9.6      23 May 2022 05:57    TPro  6.7  /  Alb  3.5  /  TBili  0.3  /  DBili  x   /  AST  16  /  ALT  21  /  AlkPhos  108  05-23

## 2022-05-24 NOTE — PROGRESS NOTE ADULT - REASON FOR ADMISSION
Elevated BPs, SOB, H/A
Elevated BPs, SOB, H/A
postpartum cardiomyopathy
postpartum sPEC
Elevated BPs, SOB, H/A

## 2022-05-24 NOTE — PROGRESS NOTE ADULT - ASSESSMENT
A/P: 25yo P1 is now PPD#10 s/p  c/b post partum cardiomyopathy is now doing well. VSS. Asxs. Repeat echo pending today to assess EF. Likely for d/c today if continues to remain stable. Plan for d/c w/ AED. Has short interval follow up with cardiology. Precautions reviewed      Patient seen with Dr. Flowers (M attending)    Villa Mejía M.D. PGY-5  Maternal Fetal Medicine Fellow  Cell: [phone] if after 5pm or weekend ask labor and delivery for on call fellow

## 2022-05-24 NOTE — PROGRESS NOTE ADULT - NS ATTEND OPT1A GEN_ALL_CORE
Medical decision making
History/Exam/Medical decision making
Medical decision making
Medical decision making

## 2022-05-24 NOTE — PROGRESS NOTE ADULT - SUBJECTIVE AND OBJECTIVE BOX
Patient seen and examined. BP improving on current regimen.   She feels much better today- states she has more energy. No SOB at rest, BEATTY, CP, palpitations.   Patient planned to get Jewel patch by EP, outpatient on 5/31.         Medications:  acetaminophen     Tablet .. 975 milliGRAM(s) Oral every 6 hours  carvedilol 25 milliGRAM(s) Oral every 12 hours  diphenhydrAMINE 25 milliGRAM(s) Oral at bedtime PRN  enalapril 10 milliGRAM(s) Oral every 12 hours  furosemide    Tablet 20 milliGRAM(s) Oral daily  guaiFENesin Oral Liquid (Sugar-Free) 200 milliGRAM(s) Oral every 6 hours PRN  heparin   Injectable 5000 Unit(s) SubCutaneous every 12 hours  hydrALAZINE 20 milliGRAM(s) Oral every 8 hours      Vitals:  Vital Signs Last 24 Hrs  T(C): 36.8 (24 May 2022 14:06), Max: 37.1 (23 May 2022 22:21)  T(F): 98.2 (24 May 2022 14:06), Max: 98.7 (23 May 2022 22:21)  HR: 104 (24 May 2022 14:06) (99 - 118)  BP: 108/75 (24 May 2022 14:06) (108/75 - 142/109)  BP(mean): --  RR: 16 (24 May 2022 14:06) (16 - 19)  SpO2: 100% (24 May 2022 14:06) (98% - 100%)    Daily     Daily     I&O's Detail    23 May 2022 07:01  -  24 May 2022 07:00  --------------------------------------------------------  IN:  Total IN: 0 mL    OUT:    Voided (mL): 1450 mL  Total OUT: 1450 mL    Total NET: -1450 mL          Physical Exam:     General: No distress. Comfortable.  HEENT: EOM intact.  Neck: Neck supple. JVP mildly elevated. No masses  Chest: Clear to auscultation bilaterally  CV: tachycardic. No murmurs, rub, or gallops. Radial pulses normal. No LE edema and is warm b/l.   Abdomen: Soft, non-distended, non-tender  Skin: No rashes or skin breakdown  Neurology: Alert and oriented times three. Sensation intact  Psych: Affect normal    Labs:                        11.1   7.15  )-----------( 497      ( 23 May 2022 05:57 )             32.7     05-23    135  |  103  |  9   ----------------------------<  64<L>  4.0   |  15<L>  |  0.61    Ca    9.6      23 May 2022 05:57    TPro  6.7  /  Alb  3.5  /  TBili  0.3  /  DBili  x   /  AST  16  /  ALT  21  /  AlkPhos  108  05-23    PT/INR - ( 23 May 2022 05:57 )   PT: 13.0 sec;   INR: 1.12 ratio         PTT - ( 23 May 2022 05:57 )  PTT:31.6 sec

## 2022-05-24 NOTE — PROGRESS NOTE ADULT - ASSESSMENT
27yo  PPD#10 s/p  c/b gHTN now admitted with postpartum cardiomyopathy/sPEC. s/p Keppra x24h for seizure ppx. Continued titration of antiHTN regimen by Cardiology. Pt symptomatically improved. Pt seen by EP yesterday evening, considing LifeVest outpatient as reccomended by cardiology. Pt for repeat TTE today with cardiology.     #sPEC  - goal BPs: <140/90  - BP control Enalapril 10 mg BID, Coreg 25 BID, Hydral 20 TID  - continue titration of antiHTN regimen by HF/Cards  - s/p Keppra 500BID x 24 hours    #Cardiomyopathy  - Continue tele monitoring for arrhythmias  - Daily Lasix 20 PO  - Antihypertensive regimen as above  - Repeat TTE today with Cardiology   - Consider LifeVest per EP  - Replete lytes PRN  - Strict I/Os  - CTPA (): Cardiomegaly and bilateral ground glass opacities with bilateral pleural effusions likely representing pulmonary edema   - CXR (): Moderate interstitial edema -> (): Indistinctness of pulmonary vasculature without significant change vs CXR 2022, concerning for persistent pulmonary edema; bilateral pleural effusions.  - TTE (): consistent with peripartum cardiomyopathy EF 30-35%  - Appreciate HF team consult     #Postpartum state  - No pain.   - Reg Diet, SLIV  - VTE ppx: HSQ and SCD for DVT ppx  - Pump at bedside  - Contraception: pt counseled extensively on risk of subsequent pregnancy with current diagnosis; pt to discuss options with private attending      Tommie PGY3

## 2022-05-24 NOTE — DISCHARGE NOTE NURSING/CASE MANAGEMENT/SOCIAL WORK - NSDCPEFALRISK_GEN_ALL_CORE
For information on Fall & Injury Prevention, visit: [email or web address] OR  [email or web address] OR  https://www.cdc.gov/steadi/patient.html

## 2022-05-24 NOTE — PROGRESS NOTE ADULT - PROVIDER SPECIALTY LIST ADULT
Heart Failure
OB
Electrophysiology
Heart Failure
Heart Failure
MFJEZ
OB
Heart Failure
OB

## 2022-05-24 NOTE — DISCHARGE NOTE NURSING/CASE MANAGEMENT/SOCIAL WORK - NSDCFUADDAPPT_GEN_ALL_CORE_FT
- Continue medications as prescribed (hold if BP is under 110/60 or HR is under 60)  -Take blood pressure with at home cuff prior to taking medications; if BP is >150/90 call MD  - Return to hospital with headaches, visual changes, abdominal pain, nausea, vomiting, chest pain or shortness of breathe  - Follow up with OB and cardiology in 3 days as scheduled  - keep track of weight, if weight is increasing >2 lbs a day, or if having SOB, worsening LE edema, could be a sign of fluid retention secondary to HF, office [phone].   -Follow up in HF clinic w/ Dr. Cory Patterson on 5/26/22 at 10 am.  -Follow up outpatient for appointment on 5/31/2022 @ 1pm at Dr. Snowden's office for Jewel patch in the EP Clinic ( 4th floor Oncology building [facility] [address], [address], [place], [place], [address] [phone] )

## 2022-05-24 NOTE — PROGRESS NOTE ADULT - ATTENDING COMMENTS
agree w/ above a/p   patient pending repeat echo  lifevest recommended by EP, case management consult

## 2022-05-25 ENCOUNTER — NON-APPOINTMENT (OUTPATIENT)
Age: 27
End: 2022-05-25

## 2022-05-26 ENCOUNTER — NON-APPOINTMENT (OUTPATIENT)
Age: 27
End: 2022-05-26

## 2022-05-26 ENCOUNTER — APPOINTMENT (OUTPATIENT)
Dept: CARDIOLOGY | Facility: CLINIC | Age: 27
End: 2022-05-26
Payer: COMMERCIAL

## 2022-05-26 VITALS
RESPIRATION RATE: 14 BRPM | BODY MASS INDEX: 35.34 KG/M2 | SYSTOLIC BLOOD PRESSURE: 117 MMHG | TEMPERATURE: 97 F | OXYGEN SATURATION: 100 % | DIASTOLIC BLOOD PRESSURE: 83 MMHG | HEIGHT: 60 IN | HEART RATE: 97 BPM | WEIGHT: 180 LBS

## 2022-05-26 DIAGNOSIS — Z82.49 FAMILY HISTORY OF ISCHEMIC HEART DISEASE AND OTHER DISEASES OF THE CIRCULATORY SYSTEM: ICD-10-CM

## 2022-05-26 DIAGNOSIS — Z78.9 OTHER SPECIFIED HEALTH STATUS: ICD-10-CM

## 2022-05-26 PROCEDURE — 99214 OFFICE O/P EST MOD 30 MIN: CPT

## 2022-05-26 PROCEDURE — 36415 COLL VENOUS BLD VENIPUNCTURE: CPT

## 2022-05-26 PROCEDURE — 93000 ELECTROCARDIOGRAM COMPLETE: CPT

## 2022-05-27 LAB
25(OH)D3 SERPL-MCNC: 9.6 NG/ML
ALBUMIN SERPL ELPH-MCNC: 3.8 G/DL
ALP BLD-CCNC: 109 U/L
ALT SERPL-CCNC: 17 U/L
ANION GAP SERPL CALC-SCNC: 17 MMOL/L
AST SERPL-CCNC: 14 U/L
BASOPHILS # BLD AUTO: 0.03 K/UL
BASOPHILS NFR BLD AUTO: 0.5 %
BILIRUB SERPL-MCNC: 0.3 MG/DL
BUN SERPL-MCNC: 7 MG/DL
CALCIUM SERPL-MCNC: 9.6 MG/DL
CHLORIDE SERPL-SCNC: 104 MMOL/L
CO2 SERPL-SCNC: 17 MMOL/L
CREAT SERPL-MCNC: 0.64 MG/DL
EGFR: 123 ML/MIN/1.73M2
EOSINOPHIL # BLD AUTO: 0.1 K/UL
EOSINOPHIL NFR BLD AUTO: 1.7 %
ESTIMATED AVERAGE GLUCOSE: 82 MG/DL
HBA1C MFR BLD HPLC: 4.5 %
HCT VFR BLD CALC: 33.2 %
HGB BLD-MCNC: 10.6 G/DL
IMM GRANULOCYTES NFR BLD AUTO: 0.7 %
LDH SERPL-CCNC: 320 U/L
LYMPHOCYTES # BLD AUTO: 1.96 K/UL
LYMPHOCYTES NFR BLD AUTO: 32.8 %
MAGNESIUM SERPL-MCNC: 1.8 MG/DL
MAN DIFF?: NORMAL
MCHC RBC-ENTMCNC: 31.4 PG
MCHC RBC-ENTMCNC: 31.9 GM/DL
MCV RBC AUTO: 98.2 FL
MONOCYTES # BLD AUTO: 0.61 K/UL
MONOCYTES NFR BLD AUTO: 10.2 %
NEUTROPHILS # BLD AUTO: 3.24 K/UL
NEUTROPHILS NFR BLD AUTO: 54.1 %
NT-PROBNP SERPL-MCNC: 2898 PG/ML
PLATELET # BLD AUTO: 465 K/UL
POTASSIUM SERPL-SCNC: 4.7 MMOL/L
PROT SERPL-MCNC: 6.9 G/DL
RBC # BLD: 3.38 M/UL
RBC # FLD: 13.9 %
SODIUM SERPL-SCNC: 138 MMOL/L
TSH SERPL-ACNC: 0.86 UIU/ML
WBC # FLD AUTO: 5.98 K/UL

## 2022-05-27 NOTE — CARDIOLOGY SUMMARY
[de-identified] : 5/26/22 NSR 98, LAD, NSST\par  [de-identified] : 5/19/2022 Echo revealed peripartum cardiomyopathy (EF 33%; LVIDd 5.4, mod-severe TR and mod PH). [de-identified] : 5/19/2022-CTPA- nulmonary edema and b/l pleural effusions with no PE\par

## 2022-05-27 NOTE — ASSESSMENT
[FreeTextEntry1] : 26 year old Female P1 with no known PMH besides gestational HTN readmitted PPD # 4 with shortness of breath with severe pre eclampsia, jyothi partum cardiomyopathy ( EF 33%) treated with Keppra, labetalol, Procardia and hydralazine and diuresed 5 L with IV diuretics. Pt is here for a post thospital follow up after admission 5/18-5/24/22.\par  Appears compensated and normotensive

## 2022-05-27 NOTE — PHYSICAL EXAM
[Well Developed] : well developed [No Acute Distress] : no acute distress [Normal Conjunctiva] : normal conjunctiva [Normal S1, S2] : normal S1, S2 [Clear Lung Fields] : clear lung fields [Good Air Entry] : good air entry [Soft] : abdomen soft [Non Tender] : non-tender [Normal Gait] : normal gait [No Edema] : no edema [Normal] : alert and oriented, normal memory [de-identified] : JVP 8 cm

## 2022-05-27 NOTE — ADDENDUM
[FreeTextEntry1] : Called with results of labs notable for decrease in serum pro BNP from peak of 6729 to 64063 to 2898 and normal CMP with K 4.7 and creat. Pt will follow up in the office.

## 2022-05-27 NOTE — HISTORY OF PRESENT ILLNESS
[FreeTextEntry1] : Erna Mitchell is a 26 year old Female P1 with no known PMH besides gestational HTN readmitted PPD # 4 with shortness of breath with severe pre eclampsia, jyothi partum cardiomyopathy ( EF 33%) treated with Keppra, labetalol, Procardia and hydralazine and diuresed 5 L with IV diuretics. Pt is here for a post thospital follow up after admission 5/18-5/24/22.\par  \par HPI: Patient presented to ED5/18/22  with c/o progressive BEATTY while walking up one flight of stairs and BLE swelling X 2days. She denied CP/SOB at rest/orthopnea and PND. No complaints of HA/dizziness or lightheadedness. No known family history of heart disease. In the ED, found to have elevated BP (159/100) treated with labetalol [id number]; O2 Sat 93% treated with O2 4LNC and CXR with increased pulmonary\par vasculature treated with IV Lasix 20mg, she diuresed 5L. Echo revealed peripartum cardiomyopathy (EF 33%; LVIDd 5.4, mod-severe TR and mod PH). Pertinent labs: Pro-BNP 6,133 and BUN/CR 6/0.46 RVP parainfluenza detected. \par \par Pt was d/c on cardiac meds including enalapril 10 mg bid, Coreg 25 mg bid, furosemide 20 mg daily, hydral 20 mg tid. States she is feeling well and is breast feeding with no formula feedings for her daughter.\par \par Currently, she states she can walk several blocks and climb a flight of stairs without dyspnea. She sleeps with one pillow with no orthopnea/PND.  Weight range 162 lbs and she is not checking her home B/P. B/P in office 117/83 with HR 97 bpm. Adhering to low salt diet but is drinking approx 3 liters of fluid a day. Admits to a occasional cough at times during day and at night, states she had before starting enalapril.\par \par She denies chest pain, palpitations, dizziness/LH, syncope and she does not have an ICD. She will discuss Jewel patch study when she follows up with EP Dr. Snowden. She is still breastfeeding so will discuss further with Dr. Snowden. \par  \par \par \par \par \par \par

## 2022-05-27 NOTE — DISCUSSION/SUMMARY
[FreeTextEntry1] : 1. Acute systolic congestive heart failure./Post partum cardiomyopathy.\par - continue Coreg 25 mg po BID\par - Continue Enalapril 10 mg po bid. Pt is breast feeding so can not take Entresto or ARB\par - continue hydralazine 20 mg po TID\par - Continue Lasix 20 mg po qd.with additional as needed fodr weight gain of 2-3 lbs in 2-3 days\par - Discussed keeping a low salt diet, avoid eating out from restaurants.\par - daily weight and B/P log, bring to appt\par - pt given HF booklet\par - labs done today with decrease in serum pro BNP to 2898 from > 6000 and K 4.7, normal BUN/creat\par - for follow up with EP for Jewel Patch\par - will repeat TTE on GDMT and if LVEF < 35%, will refer to EP for ICD\par \par  :Follow up in 2 weeks for further med up titration\par \par

## 2022-05-31 ENCOUNTER — NON-APPOINTMENT (OUTPATIENT)
Age: 27
End: 2022-05-31

## 2022-05-31 ENCOUNTER — APPOINTMENT (OUTPATIENT)
Dept: ELECTROPHYSIOLOGY | Facility: CLINIC | Age: 27
End: 2022-05-31

## 2022-06-07 ENCOUNTER — RESULT CHARGE (OUTPATIENT)
Age: 27
End: 2022-06-07

## 2022-06-07 ENCOUNTER — NON-APPOINTMENT (OUTPATIENT)
Age: 27
End: 2022-06-07

## 2022-06-09 ENCOUNTER — APPOINTMENT (OUTPATIENT)
Dept: CARDIOLOGY | Facility: CLINIC | Age: 27
End: 2022-06-09
Payer: MEDICAID

## 2022-06-09 ENCOUNTER — NON-APPOINTMENT (OUTPATIENT)
Age: 27
End: 2022-06-09

## 2022-06-09 VITALS
SYSTOLIC BLOOD PRESSURE: 104 MMHG | HEIGHT: 60 IN | DIASTOLIC BLOOD PRESSURE: 64 MMHG | OXYGEN SATURATION: 97 % | HEART RATE: 80 BPM | TEMPERATURE: 98.2 F | BODY MASS INDEX: 30.82 KG/M2 | WEIGHT: 157 LBS | RESPIRATION RATE: 16 BRPM

## 2022-06-09 DIAGNOSIS — O13.9 GESTATIONAL [PREGNANCY-INDUCED] HYPERTENSION W/OUT SIGNIFICANT PROTEINURIA, UNSPECIFIED TRIMESTER: ICD-10-CM

## 2022-06-09 DIAGNOSIS — O14.90 UNSPECIFIED PRE-ECLAMPSIA, UNSPECIFIED TRIMESTER: ICD-10-CM

## 2022-06-09 PROCEDURE — 99214 OFFICE O/P EST MOD 30 MIN: CPT

## 2022-06-09 PROCEDURE — 93000 ELECTROCARDIOGRAM COMPLETE: CPT

## 2022-06-09 RX ORDER — FERROUS SULFATE TAB EC 324 MG (65 MG FE EQUIVALENT) 324 (65 FE) MG
324 (65 FE) TABLET DELAYED RESPONSE ORAL
Refills: 0 | Status: DISCONTINUED | COMMUNITY
Start: 2022-05-16 | End: 2022-06-09

## 2022-06-09 NOTE — HISTORY OF PRESENT ILLNESS
[FreeTextEntry1] : Erna Mitchell is a 26 year old Female P1 with no known PMH besides gestational HTN readmitted PPD # 4 with shortness of breath with severe pre eclampsia, jyothi partum cardiomyopathy ( EF 33%) treated with Keppra, labetalol, Procardia and hydralazine and diuresed 5 L with IV diuretics. Pt is here for a post thospital follow up after admission 5/18-5/24/22.\par  \par HPI: Patient presented to ED5/18/22  with c/o progressive BEATTY while walking up one flight of stairs and BLE swelling X 2days. She denied CP/SOB at rest/orthopnea and PND. No complaints of HA/dizziness or lightheadedness. No known family history of heart disease. In the ED, found to have elevated BP (159/100) treated with labetalol [id number]; O2 Sat 93% treated with O2 4LNC and CXR with increased pulmonary\par vasculature treated with IV Lasix 20mg, she diuresed 5L. Echo revealed peripartum cardiomyopathy (EF 33%; LVIDd 5.4, mod-severe TR and mod PH). Pertinent labs: Pro-BNP 6,133 and BUN/CR 6/0.46 RVP parainfluenza detected. \par \par Pt was d/c on cardiac meds including enalapril 10 mg bid, Coreg 25 mg bid, furosemide 20 mg daily, hydral 20 mg tid. States she is feeling well and is breast feeding with no formula feedings for her daughter.\par \par Pt is here for a 2 week follow up and was seen by Dr. Patterson. Last visit on 5/26/22, she was told to take an additional furosemide 20 mg for one day secondary to pro BNP 2898 but took 40 mg for  2 weeks.\par \par Currently, she states she can walk 8 blocks and climb 2 flights of stairs without dyspnea. She sleeps with one pillow with no orthopnea/PND. Weight decreased to 157 lbs from 162 lbs and home B/P range 100-110 and is 104/ 64 in office today. Adhering to low salt diet and decreased fluid intake to 2L from  3 liters of fluid a day. No further cough. \par \par She denies chest pain, palpitations, dizziness/LH, syncope and she does not have an ICD. She will not have   Jewel patch due to risk of shock while holding or breastfeeding baby who is 4 weeks old. \par \par  \par \par \par \par \par \par

## 2022-06-09 NOTE — PHYSICAL EXAM
[Well Developed] : well developed [No Acute Distress] : no acute distress [Normal Conjunctiva] : normal conjunctiva [Normal S1, S2] : normal S1, S2 [Clear Lung Fields] : clear lung fields [Good Air Entry] : good air entry [Soft] : abdomen soft [Non Tender] : non-tender [Normal Gait] : normal gait [No Edema] : no edema [Normal] : alert and oriented, normal memory [de-identified] : JVP 8 cm

## 2022-06-09 NOTE — DISCUSSION/SUMMARY
[___ Week(s)] : in [unfilled] week(s) [FreeTextEntry1] : 1. S/P Acute systolic congestive heart failure./Post partum cardiomyopathy.- improved \par - continue Coreg 25 mg po BID\par - Continue Enalapril 10 mg po bid. Pt is breast feeding so can not take Entresto or ARB\par - continue hydralazine 20 mg po TID\par - Decrease Lasix back to 20 mg, was taking 40 mg daily. with additional as needed fodr weight gain of 2-3 lbs in 2-3 days\par - Discussed keeping a low salt diet, avoid eating out from restaurants.\par - daily weight and B/P log, bring to appt\par - labs done 5/26 with decrease in serum pro BNP to 2898 from > 6000 and K 4.7, normal BUN/creat\par - Pt can not have Jewel Patch due to risk of shock to baby if she is holding or breastfeeding baby\par - will repeat TTE on GDMT in August 2022 and if LVEF < 35%, will refer to EP for ICD\par - if she has any syncope or unexplained dizziness, will go to the ED for evaluation. \par \par  :Follow up in 8 weeks on the same day as TTE\par \par

## 2022-06-09 NOTE — CARDIOLOGY SUMMARY
[de-identified] : 6/9/2 NSR 76, NSST\par 5/26/22 NSR 98, LAD, NSST\par  [de-identified] : 5/19/2022 Echo revealed peripartum cardiomyopathy (EF 33%; LVIDd 5.4, mod-severe TR and mod PH). [de-identified] : 5/19/2022-CTPA- nulmonary edema and b/l pleural effusions with no PE\par

## 2022-06-09 NOTE — ASSESSMENT
[FreeTextEntry1] : 26 year old Female P1 with no known PMH besides gestational HTN readmitted PPD # 4 with shortness of breath with severe pre eclampsia, jyothi partum cardiomyopathy ( EF 33%) treated with Keppra, labetalol, Procardia and hydralazine and diuresed 5 L with IV diuretics.  Admission 5/18-5/24/22.\par  Appears euvolemic and normotensive

## 2022-06-13 ENCOUNTER — NON-APPOINTMENT (OUTPATIENT)
Age: 27
End: 2022-06-13

## 2022-07-27 ENCOUNTER — NON-APPOINTMENT (OUTPATIENT)
Age: 27
End: 2022-07-27

## 2022-07-27 ENCOUNTER — APPOINTMENT (OUTPATIENT)
Dept: CARDIOLOGY | Facility: CLINIC | Age: 27
End: 2022-07-27

## 2022-07-27 VITALS
BODY MASS INDEX: 31.02 KG/M2 | DIASTOLIC BLOOD PRESSURE: 75 MMHG | SYSTOLIC BLOOD PRESSURE: 118 MMHG | WEIGHT: 158 LBS | TEMPERATURE: 97.4 F | OXYGEN SATURATION: 99 % | HEART RATE: 73 BPM | HEIGHT: 60 IN

## 2022-07-27 PROCEDURE — 99214 OFFICE O/P EST MOD 30 MIN: CPT | Mod: 25

## 2022-07-27 PROCEDURE — 36415 COLL VENOUS BLD VENIPUNCTURE: CPT

## 2022-07-27 PROCEDURE — 93000 ELECTROCARDIOGRAM COMPLETE: CPT

## 2022-07-27 RX ORDER — ACETAMINOPHEN 500 MG/1
500 TABLET ORAL
Refills: 0 | Status: DISCONTINUED | COMMUNITY
Start: 2022-05-16 | End: 2022-07-27

## 2022-07-27 RX ORDER — ENALAPRIL MALEATE 10 MG/1
10 TABLET ORAL TWICE DAILY
Qty: 10 | Refills: 0 | Status: DISCONTINUED | COMMUNITY
Start: 2022-05-25 | End: 2022-07-27

## 2022-07-27 RX ORDER — IBUPROFEN 600 MG/1
600 TABLET, FILM COATED ORAL EVERY 6 HOURS
Refills: 0 | Status: DISCONTINUED | COMMUNITY
Start: 2022-05-16 | End: 2022-07-27

## 2022-07-27 RX ORDER — PNV NO.95/FERROUS FUM/FOLIC AC 28MG-0.8MG
28-0.8 TABLET ORAL DAILY
Refills: 0 | Status: DISCONTINUED | COMMUNITY
Start: 2022-05-16 | End: 2022-07-27

## 2022-07-27 RX ORDER — HYDRALAZINE HYDROCHLORIDE 10 MG/1
10 TABLET ORAL EVERY 8 HOURS
Qty: 30 | Refills: 0 | Status: DISCONTINUED | COMMUNITY
Start: 2022-05-25 | End: 2022-07-27

## 2022-07-27 NOTE — DISCUSSION/SUMMARY
[___ Week(s)] : in [unfilled] week(s) [EKG obtained to assist in diagnosis and management of assessed problem(s)] : EKG obtained to assist in diagnosis and management of assessed problem(s)

## 2022-07-28 LAB
ALBUMIN SERPL ELPH-MCNC: 4.8 G/DL
ALP BLD-CCNC: 89 U/L
ALT SERPL-CCNC: 26 U/L
ANION GAP SERPL CALC-SCNC: 17 MMOL/L
AST SERPL-CCNC: 18 U/L
BILIRUB SERPL-MCNC: 0.2 MG/DL
BUN SERPL-MCNC: 10 MG/DL
CALCIUM SERPL-MCNC: 9.8 MG/DL
CHLORIDE SERPL-SCNC: 103 MMOL/L
CO2 SERPL-SCNC: 21 MMOL/L
CREAT SERPL-MCNC: 0.63 MG/DL
EGFR: 124 ML/MIN/1.73M2
GLUCOSE SERPL-MCNC: 46 MG/DL
NT-PROBNP SERPL-MCNC: 471 PG/ML
POTASSIUM SERPL-SCNC: 4.2 MMOL/L
PROT SERPL-MCNC: 8.2 G/DL
SODIUM SERPL-SCNC: 141 MMOL/L

## 2022-08-02 NOTE — ASSESSMENT
[FreeTextEntry1] : Erna Mitchell is a 26 year old Female  with no prior medical history besides gestational HTN with recent diagnosis of peripartum CM (EF 33%, LVEDD ) who is here for follow-up. Appears euvolemic and normotensive with some lightheadedness that is possibly due to medication. \par \par 1. HFrEF - presumably peripartum CM \par - continue Coreg 25 mg po BID\par - as no longer breastfeeding will switch Enalapril 10 mg po bid to Entresto after 48 hour washout\par - will d/c hydralazine\par - continue lasix prn; was taking for elevated BP but isntructed to take only for weight gain \par - Discussed keeping a low salt diet, avoid eating out from restaurants.\par - daily weight and B/P log, bring to appt\par - repeat labs\par - Pt can not have Jewel Patch due to risk of shock to baby if she is holding or breastfeeding baby\par - will repeat TTE on GDMT in 2022 and if LVEF < 35%, will refer to EP for ICD\par - if she has any syncope or unexplained dizziness, will go to the ED for evaluation. \par \par RTC 3 and 6 weeks with NP, then me in 3 months\par \par

## 2022-08-02 NOTE — PHYSICAL EXAM
[Well Developed] : well developed [No Acute Distress] : no acute distress [Normal Conjunctiva] : normal conjunctiva [Normal S1, S2] : normal S1, S2 [Clear Lung Fields] : clear lung fields [Good Air Entry] : good air entry [Soft] : abdomen soft [Non Tender] : non-tender [Normal Gait] : normal gait [No Edema] : no edema [Normal] : alert and oriented, normal memory [de-identified] : J 6-8cm

## 2022-08-02 NOTE — CARDIOLOGY SUMMARY
[de-identified] : \par 7/27/22 - NSR, nl axis, LVH\par \par 6/9/2 NSR 76, NSST\par \par 5/26/22 NSR 98, LAD, NSST\par  [de-identified] : \par 5/24/2022 TTE - EF read as 33% (more c/w 25-30%), LVIDd 5.4, moderate TR, mild-mod MR [de-identified] : \par 5/19/2022-CTPA- pulmonary edema and b/l pleural effusions with no PE\par

## 2022-08-02 NOTE — HISTORY OF PRESENT ILLNESS
[FreeTextEntry1] : Erna Mitchell is a 26 year old Female  with no prior medical history besides gestational HTN with recent diagnosis of peripartum CM (EF 25-30%, LVEDD 5.2 cm) who is here for follow-up. Previously followed by Dr. Patterson. \par \par Per patient, she had been well during this prior pregnancy without issues with BP but was noted to have some proteinuria so was told she had pre-eclampsia. Was treated with baby aspirin. \par \hermelinda Had a  of daughter (Winter) at 38 weeks on 22 without issues but subsequently had elevated BPs and dyspnea so presented . Had progressive dyspnea while walking up flight of stairs and new bilateral LE edema for 2 days. Was found to have elevated BP (159/100) treated with labetalol [id number]; O2 Sat 93% treated with O2 4LNC and CXR with increased pulmonary vasculature treated with IV Lasix 20mg, she diuresed 5L. Echo revealed peripartum cardiomyopathy with EF read as 33%; LVIDd 5.4 cm. Notably had Pro-BNP 6,133 and BUN/CR 6/0.46 with positive RVP for parainfluenza. Was discharged on . \par nelson Had been seen by HF NP (last seen ) where mediations were escalated. States she feels lightheaded on a daily basis for couple of minutes at a time unrelated to position changes. Otherwise feels well. Was breastfeeding up until 1 month ago but is no longer as daughter prefers formula. \par \par Currently, she states she can walk 8 blocks and climb 2 flights of stairs without dyspnea. She sleeps with one pillow with no orthopnea/PND. Weight decreased to 157 lbs from 162 lbs and home B/P range 100-110. Adhering to low salt diet and decreased fluid intake to 2L from  3 liters of fluid a day. No further cough. Takes lasix as needed. \par \par She denies chest pain, palpitations, dizziness/LH, syncope and she does not have an ICD. She will not have   Jewel patch due to risk of shock while holding or breastfeeding baby who is 4 weeks old. \par \par Currently sexually active and not on birth control.

## 2022-08-17 ENCOUNTER — APPOINTMENT (OUTPATIENT)
Dept: CV DIAGNOSITCS | Facility: HOSPITAL | Age: 27
End: 2022-08-17

## 2022-09-21 ENCOUNTER — NON-APPOINTMENT (OUTPATIENT)
Age: 27
End: 2022-09-21

## 2022-09-21 ENCOUNTER — OUTPATIENT (OUTPATIENT)
Dept: OUTPATIENT SERVICES | Facility: HOSPITAL | Age: 27
LOS: 1 days | End: 2022-09-21

## 2022-09-21 ENCOUNTER — APPOINTMENT (OUTPATIENT)
Dept: CV DIAGNOSITCS | Facility: HOSPITAL | Age: 27
End: 2022-09-21

## 2022-09-21 ENCOUNTER — APPOINTMENT (OUTPATIENT)
Dept: HEART FAILURE | Facility: CLINIC | Age: 27
End: 2022-09-21

## 2022-09-21 VITALS
DIASTOLIC BLOOD PRESSURE: 83 MMHG | TEMPERATURE: 98.3 F | HEIGHT: 60 IN | SYSTOLIC BLOOD PRESSURE: 138 MMHG | OXYGEN SATURATION: 100 % | HEART RATE: 58 BPM | BODY MASS INDEX: 32.03 KG/M2

## 2022-09-21 VITALS — BODY MASS INDEX: 32.03 KG/M2 | WEIGHT: 164 LBS

## 2022-09-21 PROCEDURE — 93306 TTE W/DOPPLER COMPLETE: CPT | Mod: 26

## 2022-10-03 NOTE — ASU PATIENT PROFILE, ADULT - NS PRO ABUSE SCREEN AFRAID ANYONE YN
no

## 2022-12-23 ENCOUNTER — NON-APPOINTMENT (OUTPATIENT)
Age: 27
End: 2022-12-23

## 2022-12-23 ENCOUNTER — APPOINTMENT (OUTPATIENT)
Dept: HEART FAILURE | Facility: CLINIC | Age: 27
End: 2022-12-23
Payer: MEDICAID

## 2022-12-23 VITALS
SYSTOLIC BLOOD PRESSURE: 131 MMHG | WEIGHT: 170 LBS | TEMPERATURE: 98.7 F | BODY MASS INDEX: 33.2 KG/M2 | HEART RATE: 64 BPM | OXYGEN SATURATION: 99 % | DIASTOLIC BLOOD PRESSURE: 83 MMHG | RESPIRATION RATE: 16 BRPM

## 2022-12-23 PROCEDURE — 99214 OFFICE O/P EST MOD 30 MIN: CPT | Mod: 25

## 2022-12-23 PROCEDURE — 93000 ELECTROCARDIOGRAM COMPLETE: CPT

## 2022-12-23 RX ORDER — METRONIDAZOLE 500 MG/1
500 TABLET ORAL
Qty: 14 | Refills: 0 | Status: DISCONTINUED | COMMUNITY
Start: 2022-07-25 | End: 2022-12-23

## 2022-12-23 RX ORDER — BLOOD PRESSURE TEST KIT-MEDIUM
KIT MISCELLANEOUS
Qty: 1 | Refills: 0 | Status: ACTIVE | COMMUNITY
Start: 2022-12-23 | End: 1900-01-01

## 2022-12-26 NOTE — PHYSICAL EXAM
[Well Developed] : well developed [No Acute Distress] : no acute distress [Normal Conjunctiva] : normal conjunctiva [Normal S1, S2] : normal S1, S2 [Clear Lung Fields] : clear lung fields [Good Air Entry] : good air entry [Soft] : abdomen soft [Non Tender] : non-tender [Normal Gait] : normal gait [No Edema] : no edema [Normal] : alert and oriented, normal memory [de-identified] : J 6-8cm

## 2022-12-26 NOTE — HISTORY OF PRESENT ILLNESS
[FreeTextEntry1] : Erna Mitchell is a 27 year old Female  with h/o gestational HTN and pre-eclampsia c/b peripartum CM/HFrecEF (EF 25-30%, LVEDD 5.2 cm  improved to 55% ) who is here for follow-up. Previously followed by Dr. Patterson. \par \par For full initial details, please refer to note from 22. \par \par Since last visit, has been doing well. Reports with medications she feels lightheadedness (10x/day) and reduced to at night without issues. Was taking medications at the same time. Currently, she states she can walk 8 blocks and climb 2 flights of stairs without dyspnea. \par \par She sleeps with one pillow with no orthopnea/PND. Weight had decreased to 157 lbs from 162 lbs but has increased to 170 pounds. Reports eating fast food 2x/week. Hasn't checked home B/P lately. \par \par Adhering to low salt diet and has been drinking only 2 bottles/day. Hasn't taken any lasix. \par \par She denies chest pain, palpitations, dizziness/LH, syncope and she does not have an ICD. \par \par Not currently sexually active and not on birth control due to fears of it. \par \par Received Covid vaccine and denies Covid illness. Hasn't received booster.

## 2022-12-26 NOTE — CARDIOLOGY SUMMARY
[de-identified] : \par 12/23/22 - NSR, nonspecific T wave changes\par 7/27/22 - NSR, nl axis, LVH\par 6/9/2 NSR 76, NSST\par \par 5/26/22 NSR 98, LAD, NSST\par  [de-identified] : \par 9/21/22 - EF 50%, LVEDD 5.5 cm, mild TR, nl RV function\par \par 5/24/2022 TTE - EF read as 33% (more c/w 25-30%), LVIDd 5.4, moderate TR, mild-mod MR [de-identified] : \par 5/19/2022-CTPA- pulmonary edema and b/l pleural effusions with no PE\par

## 2022-12-26 NOTE — ASSESSMENT
[FreeTextEntry1] : Erna Mitchell is a 26 year old Female  with no prior medical history besides gestational HTN with recent diagnosis of peripartum CM (EF 33%, LVEDD 5.2 cm; improved to 50%) who is here for follow-up. Appears euvolemic and normotensive with some lightheadedness that is possibly due to medication/hypovolemia. \par \par 1. HFrEF - presumably peripartum CM \par - continue Coreg 25 mg po BID\par - c/w Entresto 49/51 twice/day; will stagger the medications\par - continue lasix prn; encourage hydration \par - Discussed keeping a low salt diet, avoid eating out from restaurants.\par - daily weight and B/P log, bring to appt\par - repeat TTE on GDMT improved; no indication for CD\par \par RTC 3 months with NP, 6 months with me\par \par

## 2023-03-15 ENCOUNTER — APPOINTMENT (OUTPATIENT)
Dept: HEART FAILURE | Facility: CLINIC | Age: 28
End: 2023-03-15
Payer: MEDICAID

## 2023-03-15 ENCOUNTER — NON-APPOINTMENT (OUTPATIENT)
Age: 28
End: 2023-03-15

## 2023-03-15 VITALS
WEIGHT: 177.13 LBS | TEMPERATURE: 97.5 F | SYSTOLIC BLOOD PRESSURE: 136 MMHG | HEART RATE: 55 BPM | OXYGEN SATURATION: 99 % | HEIGHT: 60 IN | DIASTOLIC BLOOD PRESSURE: 79 MMHG | BODY MASS INDEX: 34.77 KG/M2

## 2023-03-15 PROCEDURE — 93000 ELECTROCARDIOGRAM COMPLETE: CPT

## 2023-03-15 PROCEDURE — 99214 OFFICE O/P EST MOD 30 MIN: CPT | Mod: 25

## 2023-03-15 RX ORDER — FUROSEMIDE 20 MG/1
20 TABLET ORAL DAILY
Qty: 30 | Refills: 0 | Status: ACTIVE | COMMUNITY
Start: 2022-05-25 | End: 1900-01-01

## 2023-03-16 NOTE — HISTORY OF PRESENT ILLNESS
[FreeTextEntry1] : Erna Mitchell is a 27 year old Female  with h/o gestational HTN and pre-eclampsia c/b peripartum CM/HFrecEF (EF 25-30%, LVEDD 5.2 cm  improved to 55% ) who is here for follow-up. Previously followed by Dr. Patterson. \par \par For full initial details, please refer to note from 22. \par \par Since last visit 22 has been doing well.  States her daughter will be 1 year old soon. She has taking medications at the same time and only occasional dizziness. Currently, she states her walking is not limited by shortness by shortness of breath. She can climb 2 flights of stairs without dyspnea. \par \par She sleeps with one pillow with no orthopnea/PND. Weight has increased  to 170 pounds that she attributes to eating more rice and bread. Last labs 22 with normal K 4.2 and BUN/creat 10/0.63 and TTE 2022 with LVEF 54%.\par \par Tries to adhere to low salt diet and has been drinking less than 1.5 liters of fluid per day. States she had hanson twice this week. Hasn't taken any lasix. \par \par She denies chest pain, palpitations, dizziness/LH, syncope and she does not have an ICD. \par \par Not currently sexually active and not on birth control due to fears of it. She reports she may want to have another child but is not ready yet but will discuss family planning more with next visit. \par \par Received Covid vaccine and denies Covid illness. Hasn't received booster.

## 2023-03-16 NOTE — CARDIOLOGY SUMMARY
[de-identified] : 3/15/23 Sinus bradycardia 55, NSST\par 12/23/22 - NSR, nonspecific T wave changes\par 7/27/22 - NSR, nl axis, LVH\par 6/9/2 NSR 76, NSST\par \par 5/26/22 NSR 98, LAD, NSST\par  [de-identified] : \par 9/21/22 - EF 50%, LVEDD 5.5 cm, mild TR, nl RV function\par \par 5/24/2022 TTE - EF read as 33% (more c/w 25-30%), LVIDd 5.4, moderate TR, mild-mod MR [de-identified] : \par 5/19/2022-CTPA- pulmonary edema and b/l pleural effusions with no PE\par

## 2023-03-16 NOTE — ASSESSMENT
[FreeTextEntry1] : Erna Mitchell is a 27 year old Female  with no prior medical history besides gestational HTN with recent diagnosis of peripartum CM (EF 33%, LVEDD 5.2 cm; improved to 50%) who is here for follow-up. Appears euvolemic and normotensive.\par \par 1. HFrEF - presumably peripartum CM \par - continue Coreg 25 mg po BID\par - c/w Entresto 49/51 twice/day; continue to stagger the medications\par - continue lasix prn; Pt had hanson twice this week, will take on furosemide 20 mg today\par - Discussed keeping a low salt diet, avoid eating out from restaurants.\par - daily weight and B/P log, bring to appt\par - repeat TTE on GDMT improved; no indication for ICD\par - discussed family planning with patient and discussed changing meds to meds she can take with a pregnancy\par \par RTC 3 months with Dr. Sparks, will discuss family planning further at that time.\par \par

## 2023-03-16 NOTE — PHYSICAL EXAM
[Well Developed] : well developed [No Acute Distress] : no acute distress [Normal Conjunctiva] : normal conjunctiva [Normal S1, S2] : normal S1, S2 [Clear Lung Fields] : clear lung fields [Good Air Entry] : good air entry [Soft] : abdomen soft [Non Tender] : non-tender [Normal Gait] : normal gait [No Edema] : no edema [Normal] : alert and oriented, normal memory [de-identified] : J 6-8cm

## 2023-06-21 ENCOUNTER — APPOINTMENT (OUTPATIENT)
Dept: HEART FAILURE | Facility: CLINIC | Age: 28
End: 2023-06-21

## 2023-07-05 ENCOUNTER — APPOINTMENT (OUTPATIENT)
Dept: HEART FAILURE | Facility: CLINIC | Age: 28
End: 2023-07-05
Payer: MEDICAID

## 2023-07-05 ENCOUNTER — NON-APPOINTMENT (OUTPATIENT)
Age: 28
End: 2023-07-05

## 2023-07-05 VITALS
HEART RATE: 68 BPM | HEIGHT: 60 IN | OXYGEN SATURATION: 100 % | WEIGHT: 177 LBS | BODY MASS INDEX: 34.75 KG/M2 | SYSTOLIC BLOOD PRESSURE: 131 MMHG | DIASTOLIC BLOOD PRESSURE: 85 MMHG

## 2023-07-05 PROCEDURE — 93000 ELECTROCARDIOGRAM COMPLETE: CPT

## 2023-07-05 PROCEDURE — 99214 OFFICE O/P EST MOD 30 MIN: CPT | Mod: 25

## 2023-07-09 NOTE — PHYSICAL EXAM
[Well Developed] : well developed [No Acute Distress] : no acute distress [Normal Conjunctiva] : normal conjunctiva [Normal S1, S2] : normal S1, S2 [Clear Lung Fields] : clear lung fields [Good Air Entry] : good air entry [Soft] : abdomen soft [Non Tender] : non-tender [Normal Gait] : normal gait [No Edema] : no edema [Normal] : alert and oriented, normal memory [de-identified] : J 6-8cm

## 2023-07-09 NOTE — HISTORY OF PRESENT ILLNESS
[FreeTextEntry1] : Erna Mitchell is a 27 year old Female  with h/o gestational HTN and pre-eclampsia c/b peripartum CM/HFrecEF (EF 25-30%, LVEDD 5.2 cm  improved to 55% ) who is here for follow-up. Previously followed by Dr. Patterson. \par \par For full initial details, please refer to note from 22. \par \par Since last visit 3/23 has been doing well. Her daughter Winter turned 1 in May. She has taking medications at the same time and only occasional dizziness. Currently, she states her walking is not limited by shortness by shortness of breath. She can climb 2 flights of stairs without dyspnea. \par \par She sleeps with one pillow with no orthopnea/PND. Weight has been stable at 170 pounds. Had labs drawn recently by PMD. \par \par Tries to adhere to low salt diet and has been drinking less than 1.5 liters of fluid per day. States she had hanson twice this week. Hasn't taken any lasix. \par \par She denies chest pain, palpitations, dizziness/LH, syncope and she does not have an ICD. \par \par Not currently sexually active and not on birth control due to fears of it. She reports she may want to have another child but is not ready yet and is afraid of the effects of her medications on the baby. \par \par Received Covid vaccine and denies Covid illness. Hasn't received booster.

## 2023-07-09 NOTE — CARDIOLOGY SUMMARY
[de-identified] : \par 7/5/23 unchanged, HR 66\par 3/15/23 Sinus bradycardia 55, NSST\par 12/23/22 - NSR, nonspecific T wave changes\par 7/27/22 - NSR, nl axis, LVH\par 6/9/2 NSR 76, NSST\par \par 5/26/22 NSR 98, LAD, NSST\par  [de-identified] : \par 9/21/22 - EF 50%, LVEDD 5.5 cm, mild TR, nl RV function\par \par 5/24/2022 TTE - EF read as 33% (more c/w 25-30%), LVIDd 5.4, moderate TR, mild-mod MR

## 2023-10-09 RX ORDER — CARVEDILOL 25 MG/1
25 TABLET, FILM COATED ORAL TWICE DAILY
Qty: 180 | Refills: 3 | Status: ACTIVE | COMMUNITY
Start: 2022-05-25 | End: 1900-01-01

## 2023-10-13 ENCOUNTER — APPOINTMENT (OUTPATIENT)
Dept: CV DIAGNOSITCS | Facility: HOSPITAL | Age: 28
End: 2023-10-13

## 2023-10-13 ENCOUNTER — OUTPATIENT (OUTPATIENT)
Dept: OUTPATIENT SERVICES | Facility: HOSPITAL | Age: 28
LOS: 1 days | End: 2023-10-13
Payer: MEDICAID

## 2023-10-13 PROCEDURE — 93306 TTE W/DOPPLER COMPLETE: CPT | Mod: 26

## 2023-11-27 ENCOUNTER — NON-APPOINTMENT (OUTPATIENT)
Age: 28
End: 2023-11-27

## 2023-12-18 RX ORDER — SACUBITRIL AND VALSARTAN 49; 51 MG/1; MG/1
49-51 TABLET, FILM COATED ORAL
Qty: 180 | Refills: 2 | Status: ACTIVE | COMMUNITY
Start: 2022-07-27 | End: 1900-01-01

## 2024-01-10 ENCOUNTER — APPOINTMENT (OUTPATIENT)
Dept: HEART FAILURE | Facility: CLINIC | Age: 29
End: 2024-01-10

## 2024-02-21 ENCOUNTER — APPOINTMENT (OUTPATIENT)
Dept: HEART FAILURE | Facility: CLINIC | Age: 29
End: 2024-02-21
Payer: MEDICAID

## 2024-02-21 ENCOUNTER — NON-APPOINTMENT (OUTPATIENT)
Age: 29
End: 2024-02-21

## 2024-02-21 VITALS
OXYGEN SATURATION: 100 % | HEIGHT: 60 IN | DIASTOLIC BLOOD PRESSURE: 77 MMHG | WEIGHT: 172 LBS | BODY MASS INDEX: 33.77 KG/M2 | HEART RATE: 70 BPM | SYSTOLIC BLOOD PRESSURE: 122 MMHG

## 2024-02-21 PROCEDURE — 93000 ELECTROCARDIOGRAM COMPLETE: CPT

## 2024-02-21 PROCEDURE — 99214 OFFICE O/P EST MOD 30 MIN: CPT

## 2024-02-21 PROCEDURE — 36415 COLL VENOUS BLD VENIPUNCTURE: CPT

## 2024-02-21 RX ORDER — MULTIVIT-MIN/FOLIC/VIT K/LYCOP 400-300MCG
50 MCG TABLET ORAL
Qty: 30 | Refills: 1 | Status: DISCONTINUED | COMMUNITY
Start: 2023-03-15 | End: 2024-02-21

## 2024-02-22 LAB
ALBUMIN SERPL ELPH-MCNC: 4.7 G/DL
ALP BLD-CCNC: 89 U/L
ALT SERPL-CCNC: 15 U/L
ANION GAP SERPL CALC-SCNC: 13 MMOL/L
AST SERPL-CCNC: 18 U/L
BILIRUB SERPL-MCNC: 0.3 MG/DL
BUN SERPL-MCNC: 12 MG/DL
CALCIUM SERPL-MCNC: 9.9 MG/DL
CHLORIDE SERPL-SCNC: 102 MMOL/L
CHOLEST SERPL-MCNC: 202 MG/DL
CO2 SERPL-SCNC: 22 MMOL/L
CREAT SERPL-MCNC: 0.57 MG/DL
EGFR: 125 ML/MIN/1.73M2
ESTIMATED AVERAGE GLUCOSE: 97 MG/DL
HBA1C MFR BLD HPLC: 5 %
HCT VFR BLD CALC: 35.9 %
HDLC SERPL-MCNC: 38 MG/DL
HGB BLD-MCNC: 11.7 G/DL
LDLC SERPL CALC-MCNC: 136 MG/DL
MCHC RBC-ENTMCNC: 30.8 PG
MCHC RBC-ENTMCNC: 32.6 GM/DL
MCV RBC AUTO: 94.5 FL
NONHDLC SERPL-MCNC: 164 MG/DL
NT-PROBNP SERPL-MCNC: <36 PG/ML
PLATELET # BLD AUTO: 416 K/UL
POTASSIUM SERPL-SCNC: 4.6 MMOL/L
PROT SERPL-MCNC: 8.1 G/DL
RBC # BLD: 3.8 M/UL
RBC # FLD: 12.5 %
SODIUM SERPL-SCNC: 137 MMOL/L
TRIGL SERPL-MCNC: 153 MG/DL
TSH SERPL-ACNC: 0.84 UIU/ML
WBC # FLD AUTO: 6.62 K/UL

## 2024-02-22 NOTE — PHYSICAL EXAM
[Well Developed] : well developed [No Acute Distress] : no acute distress [Normal Conjunctiva] : normal conjunctiva [Normal S1, S2] : normal S1, S2 [Clear Lung Fields] : clear lung fields [Good Air Entry] : good air entry [Soft] : abdomen soft [Non Tender] : non-tender [Normal Gait] : normal gait [No Edema] : no edema [Normal] : alert and oriented, normal memory [de-identified] : J 6-8cm

## 2024-02-22 NOTE — HISTORY OF PRESENT ILLNESS
[FreeTextEntry1] : Erna Mitchell is a 28 year old Female  with h/o gestational HTN and pre-eclampsia c/b peripartum CM/HFrecEF (EF 25-30%, LVEDD 5.2 cm  improved to 65% 10/23) who is here for follow-up. Previously followed by Dr. Patterson.   For full initial details, please refer to note from 22.   Since last visit 23 has been doing well. Her daughter Winter is 2. She has been working full time in a school. Last TTE 10/2023 with normal LVEF 65%.  Currently, she states her walking is not limited by shortness by shortness of breath. She can climb 3 flights of stairs without dyspnea.   She sleeps with one pillow with no orthopnea/PND. Weight has been stable at 168 pounds. Home B/P range 118. She wants to change her PMD. She wants to have a breast reduction with Dr. Ruffin and wanted to discuss with cardiology first.   Tries to adhere to low salt diet and has been drinking less than 1.5 liters of fluid per day.  Hasn't taken any lasix in a loing time.   She denies chest pain, palpitations, dizziness/LH, syncope and she does not have an ICD due to improved LVEF. .   Not currently sexually active and not on birth control due to fears of it. She reports she may want to have another child but is not ready yet and is afraid of the effects of her medications on the baby.   Received Covid vaccine and denies Covid illness. Hasn't received booster.

## 2024-02-22 NOTE — CARDIOLOGY SUMMARY
[de-identified] : 2/21/24 NSR 70, NSST 7/5/23 unchanged, HR 66 3/15/23 Sinus bradycardia 55, NSST 12/23/22 - NSR, nonspecific T wave changes 7/27/22 - NSR, nl axis, LVH 6/9/2 NSR 76, NSST  5/26/22 NSR 98, LAD, NSST  [de-identified] : 10/13/23 TTE LVEF 65% 9/21/22 - EF 50%, LVEDD 5.5 cm, mild TR, nl RV function  5/24/2022 TTE - EF read as 33% (more c/w 25-30%), LVIDd 5.4, moderate TR, mild-mod MR

## 2024-02-22 NOTE — ADDENDUM
[FreeTextEntry1] : Called with results of labs notable for low serum pro BNP < 36, normal CBC, TSH, CMP but with elevated lipid profile with  and cholesterol 202. Admits she had [employer] before appt. She will decrease saturated fat in diet. She will schedule appt for pre op clearance with Dr. Sparks if she schedules breast reduction with Dr. Neff.

## 2024-02-22 NOTE — ASSESSMENT
[FreeTextEntry1] : Erna Mitchell is a 28 year old Female  with h/o gestational HTN and pre-eclampsia c/b peripartum CM/HFrecEF (EF 25-30%, LVEDD 5.2 cm  improved to 55%  and 65% 10/2023) who is here for follow-up. Appears euvolemic and normotensive.    1. HFrEF - presumably peripartum CM  - continue Coreg 25 mg po BID  - c/w Entresto 49/51 twice/day; continue to stagger the medications. Pt asked about coming off her meds since EF improved, will discuss further with Dr. Sparks. She also wants to get clearance if she decides to do a breast reduction with Dr. Ruffin.   - continue lasix prn  - Discussed keeping a low salt diet, avoid eating out from restaurants.  - daily weight and B/P log, bring to appt  - repeat TTE on GDMT improved; no indication for ICD  - discussed family planning with patient and discussed changing to meds she can take with a pregnancy; defer for now; aware of risks of pregnancy  - repeat TTE 10/2023 with LVEF 66%  - will check labs today and call with results    RTC 3 months for clearance for tentative surgery   .

## 2024-06-12 NOTE — H&P PST ADULT - PRIMARY CARE PROVIDER
Call back number for Summer is [phone]  
Called [facility] back and confirmed INR results from yesterday were received.  
Received call from Marina with [facility] stating she has Summer from [facility] on the line for an out of range INR for MRN [id number] patient.  Please contact to advise  
Dr Ramirez
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
## 2024-08-09 ENCOUNTER — NON-APPOINTMENT (OUTPATIENT)
Age: 29
End: 2024-08-09

## 2024-09-10 ENCOUNTER — NON-APPOINTMENT (OUTPATIENT)
Age: 29
End: 2024-09-10

## 2024-09-11 ENCOUNTER — APPOINTMENT (OUTPATIENT)
Dept: HEART FAILURE | Facility: CLINIC | Age: 29
End: 2024-09-11
Payer: COMMERCIAL

## 2024-09-11 ENCOUNTER — NON-APPOINTMENT (OUTPATIENT)
Age: 29
End: 2024-09-11

## 2024-09-11 VITALS
SYSTOLIC BLOOD PRESSURE: 118 MMHG | DIASTOLIC BLOOD PRESSURE: 77 MMHG | OXYGEN SATURATION: 100 % | HEIGHT: 60 IN | WEIGHT: 181 LBS | HEART RATE: 61 BPM | BODY MASS INDEX: 35.53 KG/M2

## 2024-09-11 PROCEDURE — 93000 ELECTROCARDIOGRAM COMPLETE: CPT

## 2024-09-11 PROCEDURE — 99214 OFFICE O/P EST MOD 30 MIN: CPT | Mod: 25

## 2024-09-11 PROCEDURE — G2211 COMPLEX E/M VISIT ADD ON: CPT | Mod: NC

## 2024-09-11 NOTE — ASSESSMENT
[FreeTextEntry1] : Erna Mitchell is a 29 year old Female  with h/o gestational HTN and pre-eclampsia c/b peripartum CM/HFrecEF (EF 25-30%, LVEDD 5.2 cm  improved to 55% ) who is here for follow-up. Appears euvolemic and normotensive.  1. HFrEF - presumably peripartum CM  - continue Coreg 25 mg po BID - c/w Entresto 49/51 twice/day; continue to stagger the medications - continue lasix prn - Discussed keeping a low salt diet, avoid eating out from restaurants. - daily weight and B/P log, bring to appt - repeat TTE  - discussed family planning with patient and discussed changing to meds she can take with a pregnancy; defer for now; aware of risks of pregnancy   2. Palpitations - infrequent  - if recurs, will do MCOT   RTC 6 months with NP, then me in 1 year or sooner prn

## 2024-09-11 NOTE — HISTORY OF PRESENT ILLNESS
[FreeTextEntry1] : Erna Mitchell is a 29 year old Female  with h/o gestational HTN and pre-eclampsia c/b peripartum CM/HFrecEF (EF 25-30%, LVEDD 5.2 cm  improved to 55% ) who is here for follow-up. Previously followed by Dr. Patterson.   For full initial details, please refer to note from 22.   Since last visit February has been doing fairly well but had 2 episodes of palpitations in July lasting 5 minutes. Had noted some weight gain and took lasix with some improvement. Had gone to PCP and had normal ECG.   Her daughter Winter turned 2 in May. She has been taking medications. Currently, she states her walking is not limited by shortness by shortness of breath. She can climb 3 flights of stairs without dyspnea.   She sleeps with one pillow with no orthopnea/PND. Weight has increased 180 pounds which she attributes to dietary indiscretion.   Tries to adhere to low salt diet and has been drinking less than 1.5 liters of fluid per day.  She denies chest pain, palpitations, dizziness/LH, syncope and she does not have an ICD.   Not currently sexually active and not on birth control due to fears of it. She reports she may want to have another child but is not ready yet and is afraid of the effects of her medications on the baby.

## 2024-09-11 NOTE — CARDIOLOGY SUMMARY
[de-identified] : 9/11/24 - sinus, NSST  7/5/23 unchanged, HR 66 3/15/23 Sinus bradycardia 55, NSST 12/23/22 - NSR, nonspecific T wave changes 7/27/22 - NSR, nl axis, LVH 6/9/2 NSR 76, NSST  5/26/22 NSR 98, LAD, NSST  [de-identified] : \par  9/21/22 - EF 50%, LVEDD 5.5 cm, mild TR, nl RV function\par  \par  5/24/2022 TTE - EF read as 33% (more c/w 25-30%), LVIDd 5.4, moderate TR, mild-mod MR

## 2024-09-11 NOTE — PHYSICAL EXAM
[Well Developed] : well developed [No Acute Distress] : no acute distress [Normal Conjunctiva] : normal conjunctiva [Normal S1, S2] : normal S1, S2 [Clear Lung Fields] : clear lung fields [Good Air Entry] : good air entry [Soft] : abdomen soft [Non Tender] : non-tender [Normal Gait] : normal gait [No Edema] : no edema [Normal] : alert and oriented, normal memory [de-identified] : J 6-8cm

## 2024-10-11 ENCOUNTER — APPOINTMENT (OUTPATIENT)
Dept: PLASTIC SURGERY | Facility: CLINIC | Age: 29
End: 2024-10-11

## 2024-11-04 ENCOUNTER — APPOINTMENT (OUTPATIENT)
Dept: CV DIAGNOSITCS | Facility: HOSPITAL | Age: 29
End: 2024-11-04

## 2024-11-04 ENCOUNTER — OUTPATIENT (OUTPATIENT)
Dept: OUTPATIENT SERVICES | Facility: HOSPITAL | Age: 29
LOS: 1 days | End: 2024-11-04
Payer: COMMERCIAL

## 2024-11-04 ENCOUNTER — RESULT REVIEW (OUTPATIENT)
Age: 29
End: 2024-11-04

## 2024-11-04 PROCEDURE — 93306 TTE W/DOPPLER COMPLETE: CPT | Mod: 26

## 2024-11-04 PROCEDURE — 76376 3D RENDER W/INTRP POSTPROCES: CPT | Mod: 26

## 2024-11-14 ENCOUNTER — NON-APPOINTMENT (OUTPATIENT)
Age: 29
End: 2024-11-14

## 2025-01-15 NOTE — H&P PST ADULT - NSWEIGHTCALCTOOLDRUG_GEN_A_CORE
Recommend follow up with neurology in the near future for further assessment and treatment of the headaches.    A  personal message from Dr. Denis Amado,  Thank you so much for allowing me to care for you today.    I pride myself in the care and attention I give all my patients.  I hope you were a witness to this tonight.   If for any reason your condition does not improve or worsens, or you have a question that was not answered during your visit you can feel free to text me on my personal phone #  # [phone].   I will answer to your message and continue your care past your emergency room visit.     Please understand that although you are being discharged because your condition has been deemed stable and able to be managed on an outpatient setting. However your condition may worsen as part of the natural progression of the illness/condition, if this occurs please come back to the emergency department for a repeat evaluation.      used

## 2025-02-10 ENCOUNTER — APPOINTMENT (OUTPATIENT)
Dept: PLASTIC SURGERY | Facility: CLINIC | Age: 30
End: 2025-02-10

## 2025-03-19 ENCOUNTER — NON-APPOINTMENT (OUTPATIENT)
Age: 30
End: 2025-03-19

## 2025-03-19 ENCOUNTER — APPOINTMENT (OUTPATIENT)
Dept: HEART FAILURE | Facility: CLINIC | Age: 30
End: 2025-03-19
Payer: COMMERCIAL

## 2025-03-19 VITALS
BODY MASS INDEX: 35.34 KG/M2 | WEIGHT: 180 LBS | TEMPERATURE: 98.3 F | DIASTOLIC BLOOD PRESSURE: 69 MMHG | OXYGEN SATURATION: 98 % | HEART RATE: 67 BPM | HEIGHT: 60 IN | SYSTOLIC BLOOD PRESSURE: 102 MMHG | RESPIRATION RATE: 16 BRPM

## 2025-03-19 PROCEDURE — G2211 COMPLEX E/M VISIT ADD ON: CPT | Mod: NC

## 2025-03-19 PROCEDURE — 93000 ELECTROCARDIOGRAM COMPLETE: CPT

## 2025-03-19 PROCEDURE — 99214 OFFICE O/P EST MOD 30 MIN: CPT

## 2025-03-19 RX ORDER — FUROSEMIDE 20 MG/1
20 TABLET ORAL
Qty: 45 | Refills: 1 | Status: ACTIVE | COMMUNITY
Start: 2025-03-19 | End: 1900-01-01

## 2025-05-23 ENCOUNTER — RX RENEWAL (OUTPATIENT)
Age: 30
End: 2025-05-23